# Patient Record
Sex: MALE | Race: WHITE | NOT HISPANIC OR LATINO | Employment: OTHER | URBAN - METROPOLITAN AREA
[De-identification: names, ages, dates, MRNs, and addresses within clinical notes are randomized per-mention and may not be internally consistent; named-entity substitution may affect disease eponyms.]

---

## 2022-09-23 ENCOUNTER — HOSPITAL ENCOUNTER (OUTPATIENT)
Facility: HOSPITAL | Age: 27
Discharge: HOME | End: 2022-09-25
Attending: EMERGENCY MEDICINE | Admitting: STUDENT IN AN ORGANIZED HEALTH CARE EDUCATION/TRAINING PROGRAM
Payer: COMMERCIAL

## 2022-09-23 DIAGNOSIS — L03.113 RIGHT FOREARM CELLULITIS: Primary | ICD-10-CM

## 2022-09-23 DIAGNOSIS — R07.9 CHEST PAIN: ICD-10-CM
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
## 2022-09-23 LAB
ALBUMIN SERPL BCP-MCNC: 3.8 G/DL (ref 3.5–5.2)
ALP SERPL-CCNC: 84 U/L (ref 55–135)
ALT SERPL W/O P-5'-P-CCNC: 47 U/L (ref 10–44)
ANION GAP SERPL CALC-SCNC: 7 MMOL/L (ref 8–16)
AST SERPL-CCNC: 42 U/L (ref 10–40)
BASOPHILS # BLD AUTO: 0.02 K/UL (ref 0–0.2)
BASOPHILS NFR BLD: 0.4 % (ref 0–1.9)
BILIRUB SERPL-MCNC: 0.6 MG/DL (ref 0.1–1)
BUN SERPL-MCNC: 8 MG/DL (ref 6–20)
CALCIUM SERPL-MCNC: 9.3 MG/DL (ref 8.7–10.5)
CHLORIDE SERPL-SCNC: 101 MMOL/L (ref 95–110)
CO2 SERPL-SCNC: 27 MMOL/L (ref 23–29)
CREAT SERPL-MCNC: 1.2 MG/DL (ref 0.5–1.4)
DIFFERENTIAL METHOD: NORMAL
EOSINOPHIL # BLD AUTO: 0.1 K/UL (ref 0–0.5)
EOSINOPHIL NFR BLD: 1.6 % (ref 0–8)
ERYTHROCYTE [DISTWIDTH] IN BLOOD BY AUTOMATED COUNT: 12.3 % (ref 11.5–14.5)
EST. GFR  (NO RACE VARIABLE): >60 ML/MIN/1.73 M^2
GLUCOSE SERPL-MCNC: 148 MG/DL (ref 70–110)
HCT VFR BLD AUTO: 43.3 % (ref 40–54)
HCV AB SERPL QL IA: NORMAL
HGB BLD-MCNC: 14.4 G/DL (ref 14–18)
HIV 1+2 AB+HIV1 P24 AG SERPL QL IA: NORMAL
IMM GRANULOCYTES # BLD AUTO: 0.02 K/UL (ref 0–0.04)
IMM GRANULOCYTES NFR BLD AUTO: 0.4 % (ref 0–0.5)
LACTATE SERPL-SCNC: 1 MMOL/L (ref 0.5–2.2)
LYMPHOCYTES # BLD AUTO: 1.3 K/UL (ref 1–4.8)
LYMPHOCYTES NFR BLD: 23.7 % (ref 18–48)
MCH RBC QN AUTO: 30.3 PG (ref 27–31)
MCHC RBC AUTO-ENTMCNC: 33.3 G/DL (ref 32–36)
MCV RBC AUTO: 91 FL (ref 82–98)
MONOCYTES # BLD AUTO: 0.6 K/UL (ref 0.3–1)
MONOCYTES NFR BLD: 10.6 % (ref 4–15)
NEUTROPHILS # BLD AUTO: 3.5 K/UL (ref 1.8–7.7)
NEUTROPHILS NFR BLD: 63.3 % (ref 38–73)
NRBC BLD-RTO: 0 /100 WBC
PLATELET # BLD AUTO: 222 K/UL (ref 150–450)
PMV BLD AUTO: 10 FL (ref 9.2–12.9)
POTASSIUM SERPL-SCNC: 3.7 MMOL/L (ref 3.5–5.1)
PROT SERPL-MCNC: 6.7 G/DL (ref 6–8.4)
RBC # BLD AUTO: 4.76 M/UL (ref 4.6–6.2)
SODIUM SERPL-SCNC: 135 MMOL/L (ref 136–145)
WBC # BLD AUTO: 5.57 K/UL (ref 3.9–12.7)

## 2022-09-23 PROCEDURE — 83605 ASSAY OF LACTIC ACID: CPT | Performed by: EMERGENCY MEDICINE

## 2022-09-23 PROCEDURE — 86803 HEPATITIS C AB TEST: CPT | Performed by: PHYSICIAN ASSISTANT

## 2022-09-23 PROCEDURE — 80053 COMPREHEN METABOLIC PANEL: CPT | Performed by: EMERGENCY MEDICINE

## 2022-09-23 PROCEDURE — 85025 COMPLETE CBC W/AUTO DIFF WBC: CPT | Performed by: EMERGENCY MEDICINE

## 2022-09-23 PROCEDURE — G0378 HOSPITAL OBSERVATION PER HR: HCPCS

## 2022-09-23 PROCEDURE — 99285 EMERGENCY DEPT VISIT HI MDM: CPT | Mod: 25

## 2022-09-23 PROCEDURE — 87389 HIV-1 AG W/HIV-1&-2 AB AG IA: CPT | Performed by: PHYSICIAN ASSISTANT

## 2022-09-23 PROCEDURE — 25000003 PHARM REV CODE 250: Performed by: EMERGENCY MEDICINE

## 2022-09-23 PROCEDURE — 96365 THER/PROPH/DIAG IV INF INIT: CPT

## 2022-09-23 PROCEDURE — 99285 EMERGENCY DEPT VISIT HI MDM: CPT | Mod: CS,,, | Performed by: EMERGENCY MEDICINE

## 2022-09-23 PROCEDURE — 99285 PR EMERGENCY DEPT VISIT,LEVEL V: ICD-10-PCS | Mod: CS,,, | Performed by: EMERGENCY MEDICINE

## 2022-09-23 PROCEDURE — 83036 HEMOGLOBIN GLYCOSYLATED A1C: CPT | Performed by: PHYSICIAN ASSISTANT

## 2022-09-23 PROCEDURE — 63600175 PHARM REV CODE 636 W HCPCS: Performed by: EMERGENCY MEDICINE

## 2022-09-23 RX ORDER — LIDOCAINE HYDROCHLORIDE 10 MG/ML
20 INJECTION INFILTRATION; PERINEURAL ONCE
Status: DISCONTINUED | OUTPATIENT
Start: 2022-09-23 | End: 2022-09-25 | Stop reason: HOSPADM

## 2022-09-23 RX ADMIN — PIPERACILLIN SODIUM AND TAZOBACTAM SODIUM 4.5 G: 4; .5 INJECTION, POWDER, LYOPHILIZED, FOR SOLUTION INTRAVENOUS at 10:09

## 2022-09-24 PROBLEM — F17.200 LIGHT TOBACCO SMOKER: Status: ACTIVE | Noted: 2022-09-24
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
## 2022-09-24 PROBLEM — R73.9 HYPERGLYCEMIA: Status: ACTIVE | Noted: 2022-09-24

## 2022-09-24 PROBLEM — Z78.9 ALCOHOL USE: Status: ACTIVE | Noted: 2022-09-24

## 2022-09-24 LAB
ALBUMIN SERPL BCP-MCNC: 3.6 G/DL (ref 3.5–5.2)
ALP SERPL-CCNC: 81 U/L (ref 55–135)
ALT SERPL W/O P-5'-P-CCNC: 41 U/L (ref 10–44)
ANION GAP SERPL CALC-SCNC: 8 MMOL/L (ref 8–16)
AST SERPL-CCNC: 40 U/L (ref 10–40)
BASOPHILS # BLD AUTO: 0.03 K/UL (ref 0–0.2)
BASOPHILS NFR BLD: 0.5 % (ref 0–1.9)
BILIRUB SERPL-MCNC: 0.4 MG/DL (ref 0.1–1)
BUN SERPL-MCNC: 11 MG/DL (ref 6–20)
CALCIUM SERPL-MCNC: 8.8 MG/DL (ref 8.7–10.5)
CHLORIDE SERPL-SCNC: 104 MMOL/L (ref 95–110)
CO2 SERPL-SCNC: 25 MMOL/L (ref 23–29)
CREAT SERPL-MCNC: 1 MG/DL (ref 0.5–1.4)
CRP SERPL-MCNC: 19.6 MG/L (ref 0–8.2)
DIFFERENTIAL METHOD: ABNORMAL
EOSINOPHIL # BLD AUTO: 0.3 K/UL (ref 0–0.5)
EOSINOPHIL NFR BLD: 4.1 % (ref 0–8)
ERYTHROCYTE [DISTWIDTH] IN BLOOD BY AUTOMATED COUNT: 12.3 % (ref 11.5–14.5)
ERYTHROCYTE [SEDIMENTATION RATE] IN BLOOD BY PHOTOMETRIC METHOD: 15 MM/HR (ref 0–23)
EST. GFR  (NO RACE VARIABLE): >60 ML/MIN/1.73 M^2
ESTIMATED AVG GLUCOSE: 100 MG/DL (ref 68–131)
GLUCOSE SERPL-MCNC: 92 MG/DL (ref 70–110)
HBA1C MFR BLD: 5.1 % (ref 4–5.6)
HCT VFR BLD AUTO: 42.6 % (ref 40–54)
HGB BLD-MCNC: 14.3 G/DL (ref 14–18)
IMM GRANULOCYTES # BLD AUTO: 0.02 K/UL (ref 0–0.04)
IMM GRANULOCYTES NFR BLD AUTO: 0.3 % (ref 0–0.5)
LYMPHOCYTES # BLD AUTO: 1.8 K/UL (ref 1–4.8)
LYMPHOCYTES NFR BLD: 27.8 % (ref 18–48)
MAGNESIUM SERPL-MCNC: 1.9 MG/DL (ref 1.6–2.6)
MCH RBC QN AUTO: 30.3 PG (ref 27–31)
MCHC RBC AUTO-ENTMCNC: 33.6 G/DL (ref 32–36)
MCV RBC AUTO: 90 FL (ref 82–98)
MONOCYTES # BLD AUTO: 1 K/UL (ref 0.3–1)
MONOCYTES NFR BLD: 15.4 % (ref 4–15)
NEUTROPHILS # BLD AUTO: 3.4 K/UL (ref 1.8–7.7)
NEUTROPHILS NFR BLD: 51.9 % (ref 38–73)
NRBC BLD-RTO: 0 /100 WBC
PHOSPHATE SERPL-MCNC: 4.1 MG/DL (ref 2.7–4.5)
PLATELET # BLD AUTO: 216 K/UL (ref 150–450)
PMV BLD AUTO: 10 FL (ref 9.2–12.9)
POTASSIUM SERPL-SCNC: 3.9 MMOL/L (ref 3.5–5.1)
PROT SERPL-MCNC: 6.1 G/DL (ref 6–8.4)
RBC # BLD AUTO: 4.72 M/UL (ref 4.6–6.2)
SARS-COV-2 RDRP RESP QL NAA+PROBE: NEGATIVE
SODIUM SERPL-SCNC: 137 MMOL/L (ref 136–145)
WBC # BLD AUTO: 6.55 K/UL (ref 3.9–12.7)

## 2022-09-24 PROCEDURE — 84100 ASSAY OF PHOSPHORUS: CPT | Performed by: INTERNAL MEDICINE

## 2022-09-24 PROCEDURE — 85652 RBC SED RATE AUTOMATED: CPT | Performed by: STUDENT IN AN ORGANIZED HEALTH CARE EDUCATION/TRAINING PROGRAM

## 2022-09-24 PROCEDURE — A9585 GADOBUTROL INJECTION: HCPCS | Performed by: STUDENT IN AN ORGANIZED HEALTH CARE EDUCATION/TRAINING PROGRAM

## 2022-09-24 PROCEDURE — 83735 ASSAY OF MAGNESIUM: CPT | Performed by: INTERNAL MEDICINE

## 2022-09-24 PROCEDURE — 99220 PR INITIAL OBSERVATION CARE,LEVL III: ICD-10-PCS | Mod: ,,, | Performed by: INTERNAL MEDICINE

## 2022-09-24 PROCEDURE — 96366 THER/PROPH/DIAG IV INF ADDON: CPT

## 2022-09-24 PROCEDURE — G0378 HOSPITAL OBSERVATION PER HR: HCPCS

## 2022-09-24 PROCEDURE — 80053 COMPREHEN METABOLIC PANEL: CPT | Performed by: INTERNAL MEDICINE

## 2022-09-24 PROCEDURE — 85025 COMPLETE CBC W/AUTO DIFF WBC: CPT | Performed by: INTERNAL MEDICINE

## 2022-09-24 PROCEDURE — 12001 RPR S/N/AX/GEN/TRNK 2.5CM/<: CPT

## 2022-09-24 PROCEDURE — 25000003 PHARM REV CODE 250: Performed by: INTERNAL MEDICINE

## 2022-09-24 PROCEDURE — 36415 COLL VENOUS BLD VENIPUNCTURE: CPT | Performed by: STUDENT IN AN ORGANIZED HEALTH CARE EDUCATION/TRAINING PROGRAM

## 2022-09-24 PROCEDURE — 36415 COLL VENOUS BLD VENIPUNCTURE: CPT | Performed by: INTERNAL MEDICINE

## 2022-09-24 PROCEDURE — 25500020 PHARM REV CODE 255: Performed by: STUDENT IN AN ORGANIZED HEALTH CARE EDUCATION/TRAINING PROGRAM

## 2022-09-24 PROCEDURE — 12002 RPR S/N/AX/GEN/TRNK2.6-7.5CM: CPT

## 2022-09-24 PROCEDURE — 63600175 PHARM REV CODE 636 W HCPCS: Performed by: INTERNAL MEDICINE

## 2022-09-24 PROCEDURE — 86140 C-REACTIVE PROTEIN: CPT | Performed by: STUDENT IN AN ORGANIZED HEALTH CARE EDUCATION/TRAINING PROGRAM

## 2022-09-24 PROCEDURE — 99220 PR INITIAL OBSERVATION CARE,LEVL III: CPT | Mod: ,,, | Performed by: INTERNAL MEDICINE

## 2022-09-24 PROCEDURE — U0002 COVID-19 LAB TEST NON-CDC: HCPCS | Performed by: STUDENT IN AN ORGANIZED HEALTH CARE EDUCATION/TRAINING PROGRAM

## 2022-09-24 PROCEDURE — 96372 THER/PROPH/DIAG INJ SC/IM: CPT | Performed by: INTERNAL MEDICINE

## 2022-09-24 RX ORDER — NALOXONE HCL 0.4 MG/ML
0.02 VIAL (ML) INJECTION
Status: DISCONTINUED | OUTPATIENT
Start: 2022-09-24 | End: 2022-09-25 | Stop reason: HOSPADM

## 2022-09-24 RX ORDER — HYDROCODONE BITARTRATE AND ACETAMINOPHEN 5; 325 MG/1; MG/1
1 TABLET ORAL EVERY 6 HOURS PRN
Status: DISCONTINUED | OUTPATIENT
Start: 2022-09-24 | End: 2022-09-25 | Stop reason: HOSPADM

## 2022-09-24 RX ORDER — IBUPROFEN 200 MG
24 TABLET ORAL
Status: DISCONTINUED | OUTPATIENT
Start: 2022-09-24 | End: 2022-09-25 | Stop reason: HOSPADM

## 2022-09-24 RX ORDER — POLYETHYLENE GLYCOL 3350 17 G/17G
17 POWDER, FOR SOLUTION ORAL 2 TIMES DAILY PRN
Status: DISCONTINUED | OUTPATIENT
Start: 2022-09-24 | End: 2022-09-25 | Stop reason: HOSPADM

## 2022-09-24 RX ORDER — SIMETHICONE 80 MG
1 TABLET,CHEWABLE ORAL 4 TIMES DAILY PRN
Status: DISCONTINUED | OUTPATIENT
Start: 2022-09-24 | End: 2022-09-25 | Stop reason: HOSPADM

## 2022-09-24 RX ORDER — GADOBUTROL 604.72 MG/ML
9 INJECTION INTRAVENOUS
Status: COMPLETED | OUTPATIENT
Start: 2022-09-24 | End: 2022-09-24

## 2022-09-24 RX ORDER — IBUPROFEN 200 MG
16 TABLET ORAL
Status: DISCONTINUED | OUTPATIENT
Start: 2022-09-24 | End: 2022-09-25 | Stop reason: HOSPADM

## 2022-09-24 RX ORDER — TALC
6 POWDER (GRAM) TOPICAL NIGHTLY PRN
Status: DISCONTINUED | OUTPATIENT
Start: 2022-09-24 | End: 2022-09-25 | Stop reason: HOSPADM

## 2022-09-24 RX ORDER — ENOXAPARIN SODIUM 100 MG/ML
40 INJECTION SUBCUTANEOUS EVERY 24 HOURS
Status: DISCONTINUED | OUTPATIENT
Start: 2022-09-24 | End: 2022-09-25 | Stop reason: HOSPADM

## 2022-09-24 RX ORDER — GLUCAGON 1 MG
1 KIT INJECTION
Status: DISCONTINUED | OUTPATIENT
Start: 2022-09-24 | End: 2022-09-25 | Stop reason: HOSPADM

## 2022-09-24 RX ORDER — SODIUM CHLORIDE 0.9 % (FLUSH) 0.9 %
10 SYRINGE (ML) INJECTION
Status: DISCONTINUED | OUTPATIENT
Start: 2022-09-24 | End: 2022-09-25 | Stop reason: HOSPADM

## 2022-09-24 RX ORDER — ONDANSETRON 4 MG/1
4 TABLET, ORALLY DISINTEGRATING ORAL EVERY 8 HOURS PRN
Status: DISCONTINUED | OUTPATIENT
Start: 2022-09-24 | End: 2022-09-25 | Stop reason: HOSPADM

## 2022-09-24 RX ORDER — ACETAMINOPHEN 325 MG/1
650 TABLET ORAL EVERY 8 HOURS PRN
Status: DISCONTINUED | OUTPATIENT
Start: 2022-09-24 | End: 2022-09-25 | Stop reason: HOSPADM

## 2022-09-24 RX ORDER — PROCHLORPERAZINE EDISYLATE 5 MG/ML
5 INJECTION INTRAMUSCULAR; INTRAVENOUS EVERY 6 HOURS PRN
Status: DISCONTINUED | OUTPATIENT
Start: 2022-09-24 | End: 2022-09-25 | Stop reason: HOSPADM

## 2022-09-24 RX ORDER — IPRATROPIUM BROMIDE AND ALBUTEROL SULFATE 2.5; .5 MG/3ML; MG/3ML
3 SOLUTION RESPIRATORY (INHALATION) EVERY 6 HOURS PRN
Status: DISCONTINUED | OUTPATIENT
Start: 2022-09-24 | End: 2022-09-25 | Stop reason: HOSPADM

## 2022-09-24 RX ADMIN — PIPERACILLIN SODIUM AND TAZOBACTAM SODIUM 4.5 G: 4; .5 INJECTION, POWDER, LYOPHILIZED, FOR SOLUTION INTRAVENOUS at 10:09

## 2022-09-24 RX ADMIN — GADOBUTROL 9 ML: 604.72 INJECTION INTRAVENOUS at 12:09

## 2022-09-24 RX ADMIN — PIPERACILLIN SODIUM AND TAZOBACTAM SODIUM 4.5 G: 4; .5 INJECTION, POWDER, LYOPHILIZED, FOR SOLUTION INTRAVENOUS at 02:09

## 2022-09-24 RX ADMIN — PIPERACILLIN SODIUM AND TAZOBACTAM SODIUM 4.5 G: 4; .5 INJECTION, POWDER, LYOPHILIZED, FOR SOLUTION INTRAVENOUS at 07:09

## 2022-09-24 RX ADMIN — ENOXAPARIN SODIUM 40 MG: 100 INJECTION SUBCUTANEOUS at 05:09

## 2022-09-24 NOTE — HPI
Geovanni Lopez is a 27 y.o. male presenting with a dog bite to the right forearm 2 days ago.  The wound has surrounding erythema and light draining purulence from dorsal forearm puncture wound, he reports mild paresthesias in a small distribution distal to the wound.  MRI shows complete laceration and 4 cm of retraction of the ECU.  He is up-to-date on tetanus and reports that the dog was up-to-date on all vaccines.  He received Zosyn in the emergency room and was admitted to hospital medicine for IV antibiotics due to right forearm cellulitis.

## 2022-09-24 NOTE — H&P
"[facility] - Emergency Dept  [facility]  History & Physical    Patient Name: Geovanni Lopez  MRN: [id number]  Patient Class: OP- Observation  Admission Date: 9/23/2022  Attending Physician: Tobin Eden MD   Primary Care Provider: No primary care provider on file.      Patient information was obtained from patient, past medical records and ER records.     Subjective:     Principal Problem:Dog bite    Chief Complaint:   Chief Complaint   Patient presents with    Animal Bite     Got bit by dog 2 days ago. Pt sent to  and did x-ray and showed air in soft tissue around the wound." Pt believes dog was up to date on shots. Pt received tetanus, rocephin, and toradol shots at .         HPI: 28 yo male with tobacco use presenting after being bitten by a dog two days ago on his right arm. He went to urgent care as the wound was bothering him, and XR showed "air in soft tissue." He believes the dog was up to date on his shots. He received a tetanus shot and abx at urgent care and abx which helped him. He was then told to present to the ED.    In ED, labs collected, ortho consulted, MRI ordered, medicine called for admission for abx.          Past Medical History:   Diagnosis Date    Scoliosis        Past Surgical History:   Procedure Laterality Date    WISDOM TOOTH EXTRACTION         Review of patient's allergies indicates:  No Known Allergies    No current facility-administered medications on file prior to encounter.     No current outpatient medications on file prior to encounter.     Family History       Problem Relation (Age of Onset)    Muscular dystrophy Brother    Rheum arthritis Father          Tobacco Use    Smoking status: Some Days     Types: Cigarettes    Smokeless tobacco: Never   Substance and Sexual Activity    Alcohol use: Yes     Alcohol/week: 18.0 standard drinks     Types: 18 Cans of beer per week    Drug use: Not Currently    Sexual activity: Not on file     Review of Systems "   Constitutional:  Negative for activity change, appetite change, chills and fever.   HENT:  Negative for congestion, hearing loss and rhinorrhea.    Eyes:  Negative for discharge, itching and visual disturbance.   Respiratory:  Negative for apnea, cough and shortness of breath.    Cardiovascular:  Negative for chest pain, palpitations and leg swelling.   Gastrointestinal:  Negative for abdominal distention, abdominal pain, constipation, diarrhea, nausea and vomiting.   Endocrine: Negative for cold intolerance and heat intolerance.   Genitourinary:  Negative for dysuria and hematuria.   Musculoskeletal:  Negative for back pain, neck pain and neck stiffness.   Skin:  Negative for rash and wound.   Neurological:  Negative for dizziness, seizures, light-headedness and headaches.   Psychiatric/Behavioral:  Negative for agitation, confusion and suicidal ideas.    Objective:     Vital Signs (Most Recent):  Temp: 97.7 °F (36.5 °C) (09/23/22 2124)  Pulse: 83 (09/23/22 2124)  Resp: 16 (09/23/22 2124)  BP: 137/73 (09/23/22 2124)  SpO2: 98 % (09/23/22 2124) Vital Signs (24h Range):  Temp:  [97.7 °F (36.5 °C)] 97.7 °F (36.5 °C)  Pulse:  [83] 83  Resp:  [16] 16  SpO2:  [98 %] 98 %  BP: (137)/(73) 137/73     Weight: 86.2 kg (190 lb)  Body mass index is 23.13 kg/m².    Physical Exam  Vitals reviewed.   Constitutional:       General: He is not in acute distress.     Appearance: He is well-developed.   HENT:      Head: Normocephalic and atraumatic.      Nose: Nose normal. No rhinorrhea.      Mouth/Throat:      Mouth: Mucous membranes are moist.   Eyes:      General: No scleral icterus.        Right eye: No discharge.         Left eye: No discharge.      Pupils: Pupils are equal, round, and reactive to light.   Neck:      Vascular: No JVD.   Cardiovascular:      Rate and Rhythm: Normal rate and regular rhythm.      Heart sounds: Normal heart sounds. No murmur heard.    No friction rub.   Pulmonary:      Effort: Pulmonary effort is  "normal. No respiratory distress.      Breath sounds: Normal breath sounds. No wheezing.   Abdominal:      General: Bowel sounds are normal. There is no distension.      Palpations: Abdomen is soft.      Tenderness: There is no abdominal tenderness.   Musculoskeletal:         General: No deformity. Normal range of motion.      Cervical back: Normal range of motion and neck supple.   Skin:     General: Skin is warm and dry.      Comments: Right arm with bit, puncture wounds, non bleeding, small amount of swelling in the area   Neurological:      General: No focal deficit present.      Mental Status: He is alert and oriented to person, place, and time.   Psychiatric:         Mood and Affect: Mood normal.         Behavior: Behavior normal.         CRANIAL NERVES     CN III, IV, VI   Pupils are equal, round, and reactive to light.     Significant Labs: All pertinent labs within the past 24 hours have been reviewed.  CBC:   Recent Labs   Lab 09/23/22  2154   WBC 5.57   HGB 14.4   HCT 43.3        CMP:   Recent Labs   Lab 09/23/22  2154   *   K 3.7      CO2 27   *   BUN 8   CREATININE 1.2   CALCIUM 9.3   PROT 6.7   ALBUMIN 3.8   BILITOT 0.6   ALKPHOS 84   AST 42*   ALT 47*   ANIONGAP 7*       Significant Imaging: I have reviewed all pertinent imaging results/findings within the past 24 hours.    Assessment/Plan:     * Dog bite  Acute, on zosyn, will continue  Tetanus shot given at outside facility  Believes dog is uptodate on vaccines  MRI performed, ortho consulted, appreciate recs  MRI results,  "1. Soft tissue edema, enhancement, and soft tissue gas within the volar subcutaneous tissues of the right forearm.  Correlate for evidence of cellulitis.  No evidence of a rim enhancing fluid collection or evidence of deep fascial or muscular edema.  2. Complete tear of the flexor carpi ulnaris tendon with 4 cm retraction"    Alcohol use  counseled on cessation  No previous history of withdrawals  Will " monitor on CIWA  Can start PRNs if getting elevated      Light tobacco smoker  counseled on cessation, nicotine patches can be ordered if needed      Hyperglycemia  Will check a1C, possibly diet induced, acute at this time, no history of diabetes        VTE Risk Mitigation (From admission, onward)         Ordered     enoxaparin injection 40 mg  Daily         09/24/22 0015     IP VTE HIGH RISK PATIENT  Once         09/24/22 0015     Place sequential compression device  Until discontinued         09/24/22 0015     IP VTE HIGH RISK PATIENT  Once         09/24/22 0015     Place sequential compression device  Until discontinued         09/24/22 0015                   Phoenix Small MD  Department of Hospital Medicine   [facility] - Emergency Dept

## 2022-09-24 NOTE — HPI
"26 yo male with tobacco use presenting after being bitten by a dog two days ago on his right arm. He went to urgent care as the wound was bothering him, and XR showed "air in soft tissue." He believes the dog was up to date on his shots. He received a tetanus shot and abx at urgent care and abx which helped him. He was then told to present to the ED.    In ED, labs collected, ortho consulted, MRI ordered, medicine called for admission for abx.      "

## 2022-09-24 NOTE — SUBJECTIVE & OBJECTIVE
Past Medical History:   Diagnosis Date    Scoliosis        Past Surgical History:   Procedure Laterality Date    WISDOM TOOTH EXTRACTION         Review of patient's allergies indicates:  No Known Allergies    Current Facility-Administered Medications   Medication    acetaminophen tablet 650 mg    albuterol-ipratropium 2.5 mg-0.5 mg/3 mL nebulizer solution 3 mL    dextrose 10% bolus 125 mL    dextrose 10% bolus 250 mL    enoxaparin injection 40 mg    glucagon (human recombinant) injection 1 mg    glucose chewable tablet 16 g    glucose chewable tablet 24 g    HYDROcodone-acetaminophen 5-325 mg per tablet 1 tablet    LIDOcaine HCL 10 mg/ml (1%) injection 20 mL    melatonin tablet 6 mg    naloxone 0.4 mg/mL injection 0.02 mg    ondansetron disintegrating tablet 4 mg    piperacillin-tazobactam 4.5 g in sodium chloride 0.9% 100 mL IVPB (ready to mix system)    polyethylene glycol packet 17 g    prochlorperazine injection Soln 5 mg    simethicone chewable tablet 80 mg    sodium chloride 0.9% flush 10 mL    sodium chloride 0.9% flush 10 mL     No current outpatient medications on file.     Family History       Problem Relation (Age of Onset)    Muscular dystrophy Brother    Rheum arthritis Father          Tobacco Use    Smoking status: Some Days     Types: Cigarettes    Smokeless tobacco: Never   Substance and Sexual Activity    Alcohol use: Yes     Alcohol/week: 18.0 standard drinks     Types: 18 Cans of beer per week    Drug use: Not Currently    Sexual activity: Not on file     ROS  Constitutional: negative for fevers/chills/night sweats  Eyes: no acute visual changes  ENT: negative acute  for hearing loss  Respiratory: negative for dyspnea  Cardiovascular: negative for chest pain  Gastrointestinal: negative for abdominal pain  Genitourinary: negative for dysuria  Neurological: negative for headaches  Behavioral/Psych: negative for hallucinations  Endocrine: negative for temperature intolerance  MSK: per  "HPI    Objective:     Vital Signs (Most Recent):  Temp: 97.7 °F (36.5 °C) (09/23/22 2124)  Pulse: 83 (09/23/22 2124)  Resp: 16 (09/23/22 2124)  BP: 137/73 (09/23/22 2124)  SpO2: 98 % (09/23/22 2124) Vital Signs (24h Range):  Temp:  [97.7 °F (36.5 °C)] 97.7 °F (36.5 °C)  Pulse:  [83] 83  Resp:  [16] 16  SpO2:  [98 %] 98 %  BP: (137)/(73) 137/73     Weight: 86.2 kg (190 lb)  Height: 6' 4" (193 cm)  Body mass index is 23.13 kg/m².      Intake/Output Summary (Last 24 hours) at 9/24/2022 0157  Last data filed at 9/23/2022 2334  Gross per 24 hour   Intake 100 ml   Output --   Net 100 ml       Ortho/SPM Exam    General:  no acute distress, appears stated age   Neuro: alert and oriented x3  Psych: normal mood  Head: normocephalic, atraumatic.  Eyes: no scleral icterus  Mouth: moist mucous membranes  Cardiovascular: extremities warm and well perfused  Lungs: breathing comfortably, equal chest rise bilat  Skin: clean, dry, intact (any exceptions noted in below musculoskeletal exam)       Musculoskeletal  Right Upper Extremity     -Several small puncture wound to the right forearm, with 1cm open wound to the dorsal forearm with tiny amount of expressible purulence, light surrounding erythema   -Mild TTP to forearm  -Deltoid, biceps, triceps intact   -Compartments soft and compressible  -A/P ROM full in fingers, wrist, elbow, shoulder  -SILT M/R/U/Ax  -Motor intact Ain/PIN/U/Ax  -WWP     Left Upper Extremity     -Skin, Intact : no ecchymosis, lesions, lacerations or abrasions   -Non tender to palpation of entire arm   -Deltoid, biceps, triceps intact   -Compartments soft and compressible  -A/P ROM full in fingers, wrist, elbow, shoulder  -SILT M/R/U/Ax  -Motor intact Ain/PIN/U/Ax  -WWP     Right Lower Extremity Exam     - Skin Intact : no ecchymosis, lesions, lacerations or abrasions   - Non-tender to palpation of the entire leg  - Quad/ Hip flexor intact   - Compartments soft and compressible  - A/P ROM full to the toes, " ankle, knee, and hip  - TA/EHL/Gastroc/FHL intact  - SILT throughout  - WWP        Left Lower Extremity Exam    - Skin Intact : no ecchymosis, lesions, lacerations or abrasions   - Non-tender to palpation of the entire leg  - Quad/ Hip flexor intact   - Compartments soft and compressible  - A/P ROM full to the toes, ankle, knee, and hip  - TA/EHL/Gastroc/FHL intact  - SILT throughout  - WWP       All joints (shoulder/elbow/wrist/hip/knee/ankle) were examined and had full ROM and were non-tender to palpation except as above         Significant Labs: CBC:   Recent Labs   Lab 09/23/22  2154   WBC 5.57   HGB 14.4   HCT 43.3        CMP:   Recent Labs   Lab 09/23/22 2154   *   K 3.7      CO2 27   *   BUN 8   CREATININE 1.2   CALCIUM 9.3   PROT 6.7   ALBUMIN 3.8   BILITOT 0.6   ALKPHOS 84   AST 42*   ALT 47*   ANIONGAP 7*     All pertinent labs within the past 24 hours have been reviewed.    Significant Imaging: I have reviewed and interpreted all pertinent imaging results/findings.  X-ray and MRI reviewed x-ray shows no acute fractures dislocations there is a small amount of subcutaneous air.  MRI shows cellulitis without signs of infection tracking along the fascial sheaths.  There is of complete laceration of the right forearm ECU with 4 cm of retraction.

## 2022-09-24 NOTE — PLAN OF CARE
Hospital Medicine Plan of Care Note    Admission H&P dated earlier this morning reviewed, and agree with assessment and plan as documented. Pt seen and examined this morning on rounds, SANGEETHA.     Evaluated and positioned by ortho, continuing IV abx. Pain well-controlled, VSS. NPO at MN for operative repair tomorrow.      Tobin Eden MD  Attending Physician  Department of Hospital Medicine  Epic secure chat preferred, or ext. [phone]  9/24/2022

## 2022-09-24 NOTE — ASSESSMENT & PLAN NOTE
counseled on cessation  No previous history of withdrawals  Will monitor on CIWA  Can start PRNs if getting elevated

## 2022-09-24 NOTE — CONSULTS
"[facility] - Surgery  Orthopedics  Consult Note    Patient Name: Geovanni Lopez  MRN: [id number]  Admission Date: 9/23/2022  Hospital Length of Stay: 0 days  Attending Provider: Tobin Eden MD  Primary Care Provider: No primary care provider on file.      Inpatient consult to Orthopedic Surgery  Consult performed by: Jensen Rojas MD  Consult ordered by: Tobin Eden MD        Subjective:     Principal Problem:Dog bite    Chief Complaint:   Chief Complaint   Patient presents with    Animal Bite     Got bit by dog 2 days ago. Pt sent to  and did x-ray and showed air in soft tissue around the wound." Pt believes dog was up to date on shots. Pt received tetanus, rocephin, and toradol shots at .         HPI: Geovanni Lopez is a 27 y.o. male presenting with a dog bite to the right forearm 2 days ago.  The wound has surrounding erythema and light draining purulence from volar forearm puncture wound, he reports mild paresthesias in a small distribution distal to the wound.  MRI shows complete laceration and 4 cm of retraction of the FCU.  He is up-to-date on tetanus and reports that the dog was up-to-date on all vaccines.  He received Zosyn in the emergency room and was admitted to hospital medicine for IV antibiotics due to right forearm cellulitis.       Past Medical History:   Diagnosis Date    Scoliosis        Past Surgical History:   Procedure Laterality Date    WISDOM TOOTH EXTRACTION         Review of patient's allergies indicates:  No Known Allergies    Current Facility-Administered Medications   Medication    acetaminophen tablet 650 mg    albuterol-ipratropium 2.5 mg-0.5 mg/3 mL nebulizer solution 3 mL    dextrose 10% bolus 125 mL    dextrose 10% bolus 250 mL    enoxaparin injection 40 mg    glucagon (human recombinant) injection 1 mg    glucose chewable tablet 16 g    glucose chewable tablet 24 g    HYDROcodone-acetaminophen 5-325 mg per tablet 1 tablet    LIDOcaine HCL 10 mg/ml (1%) injection 20 mL " "   melatonin tablet 6 mg    naloxone 0.4 mg/mL injection 0.02 mg    ondansetron disintegrating tablet 4 mg    piperacillin-tazobactam 4.5 g in sodium chloride 0.9% 100 mL IVPB (ready to mix system)    polyethylene glycol packet 17 g    prochlorperazine injection Soln 5 mg    simethicone chewable tablet 80 mg    sodium chloride 0.9% flush 10 mL    sodium chloride 0.9% flush 10 mL     No current outpatient medications on file.     Family History       Problem Relation (Age of Onset)    Muscular dystrophy Brother    Rheum arthritis Father          Tobacco Use    Smoking status: Some Days     Types: Cigarettes    Smokeless tobacco: Never   Substance and Sexual Activity    Alcohol use: Yes     Alcohol/week: 18.0 standard drinks     Types: 18 Cans of beer per week    Drug use: Not Currently    Sexual activity: Not on file     ROS  Constitutional: negative for fevers/chills/night sweats  Eyes: no acute visual changes  ENT: negative acute  for hearing loss  Respiratory: negative for dyspnea  Cardiovascular: negative for chest pain  Gastrointestinal: negative for abdominal pain  Genitourinary: negative for dysuria  Neurological: negative for headaches  Behavioral/Psych: negative for hallucinations  Endocrine: negative for temperature intolerance  MSK: per HPI    Objective:     Vital Signs (Most Recent):  Temp: 97.7 °F (36.5 °C) (09/23/22 2124)  Pulse: 83 (09/23/22 2124)  Resp: 16 (09/23/22 2124)  BP: 137/73 (09/23/22 2124)  SpO2: 98 % (09/23/22 2124) Vital Signs (24h Range):  Temp:  [97.7 °F (36.5 °C)] 97.7 °F (36.5 °C)  Pulse:  [83] 83  Resp:  [16] 16  SpO2:  [98 %] 98 %  BP: (137)/(73) 137/73     Weight: 86.2 kg (190 lb)  Height: 6' 4" (193 cm)  Body mass index is 23.13 kg/m².      Intake/Output Summary (Last 24 hours) at 9/24/2022 0157  Last data filed at 9/23/2022 2334  Gross per 24 hour   Intake 100 ml   Output --   Net 100 ml       Ortho/SPM Exam    General:  no acute distress, appears stated age   Neuro: alert and " oriented x3  Psych: normal mood  Head: normocephalic, atraumatic.  Eyes: no scleral icterus  Mouth: moist mucous membranes  Cardiovascular: extremities warm and well perfused  Lungs: breathing comfortably, equal chest rise bilat  Skin: clean, dry, intact (any exceptions noted in below musculoskeletal exam)       Musculoskeletal  Right Upper Extremity     -Several small puncture wound to the right forearm, with 1cm open wound to the dorsal forearm with tiny amount of expressible purulence, light surrounding erythema   -Mild TTP to forearm  -Deltoid, biceps, triceps intact   -Compartments soft and compressible  -A/P ROM full in fingers, wrist, elbow, shoulder  -SILT M/R/U/Ax  -Motor intact Ain/PIN/U/Ax  -WWP     Left Upper Extremity     -Skin, Intact : no ecchymosis, lesions, lacerations or abrasions   -Non tender to palpation of entire arm   -Deltoid, biceps, triceps intact   -Compartments soft and compressible  -A/P ROM full in fingers, wrist, elbow, shoulder  -SILT M/R/U/Ax  -Motor intact Ain/PIN/U/Ax  -WWP     Right Lower Extremity Exam     - Skin Intact : no ecchymosis, lesions, lacerations or abrasions   - Non-tender to palpation of the entire leg  - Quad/ Hip flexor intact   - Compartments soft and compressible  - A/P ROM full to the toes, ankle, knee, and hip  - TA/EHL/Gastroc/FHL intact  - SILT throughout  - WWP        Left Lower Extremity Exam    - Skin Intact : no ecchymosis, lesions, lacerations or abrasions   - Non-tender to palpation of the entire leg  - Quad/ Hip flexor intact   - Compartments soft and compressible  - A/P ROM full to the toes, ankle, knee, and hip  - TA/EHL/Gastroc/FHL intact  - SILT throughout  - WWP       All joints (shoulder/elbow/wrist/hip/knee/ankle) were examined and had full ROM and were non-tender to palpation except as above         Significant Labs: CBC:   Recent Labs   Lab 09/23/22 2154   WBC 5.57   HGB 14.4   HCT 43.3        CMP:   Recent Labs   Lab 09/23/22 2154    *   K 3.7      CO2 27   *   BUN 8   CREATININE 1.2   CALCIUM 9.3   PROT 6.7   ALBUMIN 3.8   BILITOT 0.6   ALKPHOS 84   AST 42*   ALT 47*   ANIONGAP 7*     All pertinent labs within the past 24 hours have been reviewed.    Significant Imaging: I have reviewed and interpreted all pertinent imaging results/findings.  X-ray and MRI reviewed x-ray shows no acute fractures dislocations there is a small amount of subcutaneous air.  MRI shows cellulitis without signs of infection tracking along the fascial sheaths.  There is of complete laceration of the right forearm FCU with 4 cm of retraction.    Assessment/Plan:     * Dog bite: Right forearm  Geovanni Lopez is a 27 y.o. male presenting with a 2 day old dog bite injury to the right volar forearm with purulent drainage from 1 cm wound to the volar forearm.  He reports the dog was up-to-date on all vaccines.  He is up-to-date on tetanus and received Zosyn on admission to the ER.  He is admitted to Hospital Medicine for IV antibiotics. He is a  and uses his right hand frequently.     --Irrigation debridement and packing placement performed in the emergency room   --Continue IV antibiotics   --Elevate RUE and splint   --Pain control MM  --NPO as a precaution   --Will discuss further plan with staff    Procedure Note: Laceration Repair  After time out was performed and patient ID, side, and site were verified, the right arm was sterilly prepped in the standard fashion.  10 mL's of 1% lidocaine were injected around the site with a 25-gauge needle. After adequate analgesia was obtained, the wound was examined. Examination showed a 1cm wound that tracted deep and distal about 4cm. The laceration was then thoroughly irrigated with normal saline and betadine. At this point, the wound was packed with gauze and covered with a soft dressings. The patient tolerated the procedure well with no complications.  Blood loss was minimal.            Jensen  MD Bob  Orthopedics  [facility] - Surgery

## 2022-09-24 NOTE — SUBJECTIVE & OBJECTIVE
Past Medical History:   Diagnosis Date    Scoliosis        Past Surgical History:   Procedure Laterality Date    WISDOM TOOTH EXTRACTION         Review of patient's allergies indicates:  No Known Allergies    No current facility-administered medications on file prior to encounter.     No current outpatient medications on file prior to encounter.     Family History       Problem Relation (Age of Onset)    Muscular dystrophy Brother    Rheum arthritis Father          Tobacco Use    Smoking status: Some Days     Types: Cigarettes    Smokeless tobacco: Never   Substance and Sexual Activity    Alcohol use: Yes     Alcohol/week: 18.0 standard drinks     Types: 18 Cans of beer per week    Drug use: Not Currently    Sexual activity: Not on file     Review of Systems   Constitutional:  Negative for activity change, appetite change, chills and fever.   HENT:  Negative for congestion, hearing loss and rhinorrhea.    Eyes:  Negative for discharge, itching and visual disturbance.   Respiratory:  Negative for apnea, cough and shortness of breath.    Cardiovascular:  Negative for chest pain, palpitations and leg swelling.   Gastrointestinal:  Negative for abdominal distention, abdominal pain, constipation, diarrhea, nausea and vomiting.   Endocrine: Negative for cold intolerance and heat intolerance.   Genitourinary:  Negative for dysuria and hematuria.   Musculoskeletal:  Negative for back pain, neck pain and neck stiffness.   Skin:  Negative for rash and wound.   Neurological:  Negative for dizziness, seizures, light-headedness and headaches.   Psychiatric/Behavioral:  Negative for agitation, confusion and suicidal ideas.    Objective:     Vital Signs (Most Recent):  Temp: 97.7 °F (36.5 °C) (09/23/22 2124)  Pulse: 83 (09/23/22 2124)  Resp: 16 (09/23/22 2124)  BP: 137/73 (09/23/22 2124)  SpO2: 98 % (09/23/22 2124) Vital Signs (24h Range):  Temp:  [97.7 °F (36.5 °C)] 97.7 °F (36.5 °C)  Pulse:  [83] 83  Resp:  [16] 16  SpO2:  [98  %] 98 %  BP: (137)/(73) 137/73     Weight: 86.2 kg (190 lb)  Body mass index is 23.13 kg/m².    Physical Exam  Vitals reviewed.   Constitutional:       General: He is not in acute distress.     Appearance: He is well-developed.   HENT:      Head: Normocephalic and atraumatic.      Nose: Nose normal. No rhinorrhea.      Mouth/Throat:      Mouth: Mucous membranes are moist.   Eyes:      General: No scleral icterus.        Right eye: No discharge.         Left eye: No discharge.      Pupils: Pupils are equal, round, and reactive to light.   Neck:      Vascular: No JVD.   Cardiovascular:      Rate and Rhythm: Normal rate and regular rhythm.      Heart sounds: Normal heart sounds. No murmur heard.    No friction rub.   Pulmonary:      Effort: Pulmonary effort is normal. No respiratory distress.      Breath sounds: Normal breath sounds. No wheezing.   Abdominal:      General: Bowel sounds are normal. There is no distension.      Palpations: Abdomen is soft.      Tenderness: There is no abdominal tenderness.   Musculoskeletal:         General: No deformity. Normal range of motion.      Cervical back: Normal range of motion and neck supple.   Skin:     General: Skin is warm and dry.      Comments: Right arm with bit, puncture wounds, non bleeding, small amount of swelling in the area   Neurological:      General: No focal deficit present.      Mental Status: He is alert and oriented to person, place, and time.   Psychiatric:         Mood and Affect: Mood normal.         Behavior: Behavior normal.         CRANIAL NERVES     CN III, IV, VI   Pupils are equal, round, and reactive to light.     Significant Labs: All pertinent labs within the past 24 hours have been reviewed.  CBC:   Recent Labs   Lab 09/23/22 2154   WBC 5.57   HGB 14.4   HCT 43.3        CMP:   Recent Labs   Lab 09/23/22 2154   *   K 3.7      CO2 27   *   BUN 8   CREATININE 1.2   CALCIUM 9.3   PROT 6.7   ALBUMIN 3.8   BILITOT 0.6    ALKPHOS 84   AST 42*   ALT 47*   ANIONGAP 7*       Significant Imaging: I have reviewed all pertinent imaging results/findings within the past 24 hours.

## 2022-09-24 NOTE — ED NOTES
"Geovanni Lopez, a 27 y.o. male presents to the ED w/ complaint of     Triage note:  Chief Complaint   Patient presents with    Animal Bite     Got bit by dog 2 days ago. Pt sent to  and did x-ray and showed air in soft tissue around the wound." Pt believes dog was up to date on shots. Pt received tetanus, rocephin, and toradol shots at .      Review of patient's allergies indicates:  No Known Allergies  History reviewed. No pertinent past medical history. Patient identifiers for Geovanni Lopez checked and correct.    LOC: The patient is awake, alert and aware of environment with an appropriate affect, the patient is oriented x 4 and speaking appropriately.  APPEARANCE: Patient resting comfortably and in no acute distress, patient is clean and well groomed, patient's clothing is properly fastened.  SKIN: The skin is warm and dry, color consistent with ethnicity, patient has normal skin turgor and moist mucus membranes, skin intact, no breakdown or bruising noted. Unable to assess skin around animal bite d/t it being wrapped  MUSCULOSKELETAL: Patient moving all extremities well, no obvious swelling or deformities noted.  RESPIRATORY: Airway is open and patent, respirations are spontaneous and even, patient has a normal effort and rate.  CARDIAC: Patient has a normal rate and rhythm, no periphreal edema noted, capillary refill < 3 seconds. Normal +2 pedal pulses present.  ABDOMEN: Soft and non tender to palpation, no distention noted. Patient denies any nausea, vomiting, diarrhea, or constipation.   NEUROLOGIC: Eyes open spontaneously, PERRL, behavior appropriate to situation, follows commands, facial expression symmetrical, bilateral hand grasp equal and even, purposeful motor response noted, normal sensation in all extremities.     Allergies reported: Review of patient's allergies indicates:  No Known Allergies   "

## 2022-09-24 NOTE — ASSESSMENT & PLAN NOTE
Geovanni Lopez is a 27 y.o. male presenting with a 2 day old dog bite injury to the right dorsal forearm with purulent drainage from 1 cm wound to the dorsal forearm.  He reports the dog was up-to-date on all vaccines.  He is up-to-date on tetanus and received Zosyn on admission to the ER.  He is admitted to Hospital Medicine for IV antibiotics. He is a  and uses his right hand frequently.     --Irrigation debridement and packing placement performed in the emergency room   --Continue IV antibiotics   --Elevate RUE   --Pain control MM  --NPO as a precaution   --Will discuss further plan with staff    Procedure Note: Laceration Repair  After time out was performed and patient ID, side, and site were verified, the right arm was sterilly prepped in the standard fashion.  10 mL's of 1% lidocaine were injected around the site with a 25-gauge needle. After adequate analgesia was obtained, the wound was examined. Examination showed a 1cm wound that tract deep and distal about 4cm. The laceration was then thoroughly irrigated with normal saline and betadine. At this point, the wound was packed with gauze and covered with a soft dressings. The patient tolerated the procedure well with no complications.  Blood loss was minimal.

## 2022-09-24 NOTE — ED PROVIDER NOTES
"Encounter Date: 9/23/2022    SCRIBE #1 NOTE: I, Zeenat Casas, am scribing for, and in the presence of,  Moses Maldonado III, MD. I have scribed the entire note.     History     Chief Complaint   Patient presents with    Animal Bite     Got bit by dog 2 days ago. Pt sent to  and did x-ray and showed air in soft tissue around the wound." Pt believes dog was up to date on shots. Pt received tetanus, rocephin, and toradol shots at .      Time patient was seen by the provider: 9:55 PM      The patient is a 27 y.o. male with no significant past medical history who presents to the ED with a complaint of dog bite to his right lower arm 2 days ago. He notes numbness to his distal forearm near the wound. He went to urgent care today where he received an X-ray, tetanus shot, antibiotics, and pain medication. The X-ray showed air in the wound and he was told to come to the ED.    The history is provided by the patient and medical records. No  was used.   Review of patient's allergies indicates:  No Known Allergies  History reviewed. No pertinent past medical history.  History reviewed. No pertinent surgical history.  History reviewed. No pertinent family history.     Review of Systems   Constitutional:  Negative for fever.   Eyes:  Negative for visual disturbance.   Respiratory:  Negative for shortness of breath.    Cardiovascular:  Negative for chest pain.   Gastrointestinal:  Negative for abdominal pain, nausea and vomiting.   Genitourinary:  Negative for dysuria.   Musculoskeletal:  Negative for back pain and neck pain.   Skin:  Positive for wound (right forearm). Negative for rash.   Neurological:  Positive for numbness (right distal forearm). Negative for weakness.   Hematological:  Does not bruise/bleed easily.     Physical Exam     Initial Vitals [09/23/22 2124]   BP Pulse Resp Temp SpO2   137/73 83 16 97.7 °F (36.5 °C) 98 %      MAP       --         Physical Exam    Nursing note and vitals " reviewed.  Constitutional: He appears well-developed and well-nourished. No distress.   HENT:   Head: Normocephalic and atraumatic.   Eyes: EOM are normal. Pupils are equal, round, and reactive to light.   Neck: Neck supple.   Normal range of motion.  Musculoskeletal:         General: No tenderness or edema. Normal range of motion.      Cervical back: Normal range of motion and neck supple.     Neurological: He is alert and oriented to person, place, and time. He has normal strength. No cranial nerve deficit or sensory deficit.   Skin: Skin is warm. No rash noted. There is erythema.   8 by 6 cm area of erythema and warmth to right forearm with some crepitus present. A few small puncture wounds with one large puncture wound at the center of the erythema. Subjective numbness to distal medial forearm.   Psychiatric: He has a normal mood and affect. His behavior is normal. Judgment and thought content normal.       ED Course   Procedures  Labs Reviewed   COMPREHENSIVE METABOLIC PANEL - Abnormal; Notable for the following components:       Result Value    Sodium 135 (*)     Glucose 148 (*)     AST 42 (*)     ALT 47 (*)     Anion Gap 7 (*)     All other components within normal limits    Narrative:     Release to patient->Immediate   HIV 1 / 2 ANTIBODY    Narrative:     Release to patient->Immediate   HEPATITIS C ANTIBODY    Narrative:     Release to patient->Immediate   CBC W/ AUTO DIFFERENTIAL    Narrative:     Release to patient->Immediate   LACTIC ACID, PLASMA    Narrative:     Release to patient->Immediate   SARS-COV-2 RDRP GENE          Imaging Results              MRI Forearm W WO Contrast Right (In process)                      X-Ray Forearm Right (Final result)  Result time 09/23/22 22:52:12      Final result by Tree Hidalgo MD (09/23/22 22:52:12)                   Impression:      There is no evidence of fracture or subluxation.    Soft tissue injury involving the anteromedial right forearm with air in the  soft tissues consistent with history of penetrating animal bite injury. No radiopaque foreign body seen.      Electronically signed by: Tree Hidalgo MD  Date:    09/23/2022  Time:    22:52               Narrative:    EXAMINATION:  XR FOREARM RIGHT    CLINICAL HISTORY:  Bitten by dog, initial encounter    TECHNIQUE:  AP and lateral views of the right forearm were performed.    COMPARISON:  None    FINDINGS:  No fractures or dislocations.  Unremarkable visualized bony structures. Soft tissue injury involving the anteromedial right forearm with air in the soft tissues consistent with history of penetrating animal bite injury.  No radiopaque foreign body seen.                                       Medications   LIDOcaine HCL 10 mg/ml (1%) injection 20 mL (has no administration in time range)   piperacillin-tazobactam 4.5 g in sodium chloride 0.9% 100 mL IVPB (ready to mix system) (0 g Intravenous Stopped 9/23/22 6665)     Medical Decision Making:   History:   Old Medical Records: I decided to obtain old medical records.  Initial Assessment:   Patient presents with dog bite to right forearm 2 days ago now with cellulitis, crepitus, and air in wound as per X-ray done at urgent care. Will check basic labs, re X-ray forearm, start IV antibiotics, and consult orthopedics who has recommended MRI forearm.  Differential Diagnosis:   Initial differential includes but is not limited to cellulitis, dog bite, necrotizing fasciitis  Independently Interpreted Test(s):   I have ordered and independently interpreted X-rays - see summary below.       <> Summary of X-Ray Reading(s): Forearm x-ray:  Wound with air present  Clinical Tests:   Lab Tests: Ordered and Reviewed  Radiological Study: Ordered and Reviewed  ED Management:  23:43  Patient labs unremarkable. X-ray does show some air in the wound. Discussed with internal medicine and ortho and will admit for IV antibiotics and MRI.  Other:   I have discussed this case with another  health care provider.       <> Summary of the Discussion: Orthopedic surgery, IM        Scribe Attestation:   Scribe #1: I performed the above scribed service and the documentation accurately describes the services I performed. I attest to the accuracy of the note.            I, Dr. Moses Maldonado III, personally performed the services described in this documentation. All medical record entries made by the scribe were at my direction and in my presence.  I have reviewed the chart and agree that the record reflects my personal performance and is accurate and complete. Moses Maldonado III, MD.  12:06 AM 09/24/2022         Clinical Impression:   Final diagnoses:  [W54.0XXA] Dog bite  [L03.113] Right forearm cellulitis (Primary)        ED Disposition Condition    Observation Stable                Moses Maldonado III, MD  09/24/22 0007

## 2022-09-24 NOTE — ASSESSMENT & PLAN NOTE
"Acute, on zosyn, will continue  Tetanus shot given at outside facility  Believes dog is uptodate on vaccines  MRI performed, ortho consulted, appreciate recs  MRI results,  "1. Soft tissue edema, enhancement, and soft tissue gas within the volar subcutaneous tissues of the right forearm.  Correlate for evidence of cellulitis.  No evidence of a rim enhancing fluid collection or evidence of deep fascial or muscular edema.  2. Complete tear of the flexor carpi ulnaris tendon with 4 cm retraction"  "

## 2022-09-25 VITALS
WEIGHT: 190.06 LBS | HEART RATE: 59 BPM | RESPIRATION RATE: 18 BRPM | SYSTOLIC BLOOD PRESSURE: 128 MMHG | TEMPERATURE: 98 F | DIASTOLIC BLOOD PRESSURE: 62 MMHG | HEIGHT: 76 IN | OXYGEN SATURATION: 100 % | BODY MASS INDEX: 23.14 KG/M2

## 2022-09-25 LAB
ALBUMIN SERPL BCP-MCNC: 3.5 G/DL (ref 3.5–5.2)
ALP SERPL-CCNC: 80 U/L (ref 55–135)
ALT SERPL W/O P-5'-P-CCNC: 53 U/L (ref 10–44)
ANION GAP SERPL CALC-SCNC: 7 MMOL/L (ref 8–16)
AST SERPL-CCNC: 48 U/L (ref 10–40)
BASOPHILS # BLD AUTO: 0.03 K/UL (ref 0–0.2)
BASOPHILS NFR BLD: 0.5 % (ref 0–1.9)
BILIRUB SERPL-MCNC: 0.6 MG/DL (ref 0.1–1)
BUN SERPL-MCNC: 9 MG/DL (ref 6–20)
CALCIUM SERPL-MCNC: 9 MG/DL (ref 8.7–10.5)
CHLORIDE SERPL-SCNC: 104 MMOL/L (ref 95–110)
CO2 SERPL-SCNC: 26 MMOL/L (ref 23–29)
CREAT SERPL-MCNC: 1.1 MG/DL (ref 0.5–1.4)
DIFFERENTIAL METHOD: NORMAL
EOSINOPHIL # BLD AUTO: 0.3 K/UL (ref 0–0.5)
EOSINOPHIL NFR BLD: 4.6 % (ref 0–8)
ERYTHROCYTE [DISTWIDTH] IN BLOOD BY AUTOMATED COUNT: 12.3 % (ref 11.5–14.5)
EST. GFR  (NO RACE VARIABLE): >60 ML/MIN/1.73 M^2
GLUCOSE SERPL-MCNC: 86 MG/DL (ref 70–110)
HCT VFR BLD AUTO: 43.2 % (ref 40–54)
HGB BLD-MCNC: 14.2 G/DL (ref 14–18)
IMM GRANULOCYTES # BLD AUTO: 0.01 K/UL (ref 0–0.04)
IMM GRANULOCYTES NFR BLD AUTO: 0.2 % (ref 0–0.5)
LYMPHOCYTES # BLD AUTO: 2.1 K/UL (ref 1–4.8)
LYMPHOCYTES NFR BLD: 36.5 % (ref 18–48)
MAGNESIUM SERPL-MCNC: 1.7 MG/DL (ref 1.6–2.6)
MCH RBC QN AUTO: 30.1 PG (ref 27–31)
MCHC RBC AUTO-ENTMCNC: 32.9 G/DL (ref 32–36)
MCV RBC AUTO: 92 FL (ref 82–98)
MONOCYTES # BLD AUTO: 0.9 K/UL (ref 0.3–1)
MONOCYTES NFR BLD: 14.5 % (ref 4–15)
NEUTROPHILS # BLD AUTO: 2.6 K/UL (ref 1.8–7.7)
NEUTROPHILS NFR BLD: 43.7 % (ref 38–73)
NRBC BLD-RTO: 0 /100 WBC
PHOSPHATE SERPL-MCNC: 4.2 MG/DL (ref 2.7–4.5)
PLATELET # BLD AUTO: 219 K/UL (ref 150–450)
PMV BLD AUTO: 10.3 FL (ref 9.2–12.9)
POTASSIUM SERPL-SCNC: 4.1 MMOL/L (ref 3.5–5.1)
PROT SERPL-MCNC: 6.3 G/DL (ref 6–8.4)
RBC # BLD AUTO: 4.71 M/UL (ref 4.6–6.2)
SODIUM SERPL-SCNC: 137 MMOL/L (ref 136–145)
WBC # BLD AUTO: 5.86 K/UL (ref 3.9–12.7)

## 2022-09-25 PROCEDURE — 85025 COMPLETE CBC W/AUTO DIFF WBC: CPT | Performed by: INTERNAL MEDICINE

## 2022-09-25 PROCEDURE — 36415 COLL VENOUS BLD VENIPUNCTURE: CPT | Performed by: INTERNAL MEDICINE

## 2022-09-25 PROCEDURE — G0378 HOSPITAL OBSERVATION PER HR: HCPCS

## 2022-09-25 PROCEDURE — 84100 ASSAY OF PHOSPHORUS: CPT | Performed by: INTERNAL MEDICINE

## 2022-09-25 PROCEDURE — 99217 PR OBSERVATION CARE DISCHARGE: ICD-10-PCS | Mod: ,,, | Performed by: STUDENT IN AN ORGANIZED HEALTH CARE EDUCATION/TRAINING PROGRAM

## 2022-09-25 PROCEDURE — 96366 THER/PROPH/DIAG IV INF ADDON: CPT

## 2022-09-25 PROCEDURE — 25000003 PHARM REV CODE 250: Performed by: INTERNAL MEDICINE

## 2022-09-25 PROCEDURE — 83735 ASSAY OF MAGNESIUM: CPT | Performed by: INTERNAL MEDICINE

## 2022-09-25 PROCEDURE — 99217 PR OBSERVATION CARE DISCHARGE: CPT | Mod: ,,, | Performed by: STUDENT IN AN ORGANIZED HEALTH CARE EDUCATION/TRAINING PROGRAM

## 2022-09-25 PROCEDURE — 80053 COMPREHEN METABOLIC PANEL: CPT | Performed by: INTERNAL MEDICINE

## 2022-09-25 PROCEDURE — 63600175 PHARM REV CODE 636 W HCPCS: Performed by: INTERNAL MEDICINE

## 2022-09-25 RX ORDER — HYDROCODONE BITARTRATE AND ACETAMINOPHEN 5; 325 MG/1; MG/1
1 TABLET ORAL EVERY 8 HOURS PRN
Qty: 9 TABLET | Refills: 0 | Status: SHIPPED | OUTPATIENT
Start: 2022-09-25 | End: 2022-09-28

## 2022-09-25 RX ORDER — AMOXICILLIN AND CLAVULANATE POTASSIUM 875; 125 MG/1; MG/1
1 TABLET, FILM COATED ORAL EVERY 12 HOURS
Qty: 10 TABLET | Refills: 0 | Status: SHIPPED | OUTPATIENT
Start: 2022-09-25 | End: 2022-09-30

## 2022-09-25 RX ADMIN — PIPERACILLIN SODIUM AND TAZOBACTAM SODIUM 4.5 G: 4; .5 INJECTION, POWDER, LYOPHILIZED, FOR SOLUTION INTRAVENOUS at 05:09

## 2022-09-25 RX ADMIN — HYDROCODONE BITARTRATE AND ACETAMINOPHEN 1 TABLET: 5; 325 TABLET ORAL at 08:09

## 2022-09-25 NOTE — H&P (VIEW-ONLY)
"[facility]  Orthopedics  Progress Note    Patient Name: Geovanni Lopez  MRN: [id number]  Admission Date: 9/23/2022  Hospital Length of Stay: 0 days  Attending Provider: Tobin Eden MD  Primary Care Provider: Primary Doctor No    Subjective:     Principal Problem:Dog bite    Principal Orthopedic Problem: right volar forearm dog bite with traumatic FCU laceration     Interval History: NAEON. VS wnl. Pain improving. Splinted and elevated    Review of patient's allergies indicates:  No Known Allergies    Current Facility-Administered Medications   Medication    acetaminophen tablet 650 mg    albuterol-ipratropium 2.5 mg-0.5 mg/3 mL nebulizer solution 3 mL    dextrose 10% bolus 125 mL    dextrose 10% bolus 250 mL    enoxaparin injection 40 mg    glucagon (human recombinant) injection 1 mg    glucose chewable tablet 16 g    glucose chewable tablet 24 g    HYDROcodone-acetaminophen 5-325 mg per tablet 1 tablet    LIDOcaine HCL 10 mg/ml (1%) injection 20 mL    melatonin tablet 6 mg    naloxone 0.4 mg/mL injection 0.02 mg    ondansetron disintegrating tablet 4 mg    piperacillin-tazobactam 4.5 g in sodium chloride 0.9% 100 mL IVPB (ready to mix system)    polyethylene glycol packet 17 g    prochlorperazine injection Soln 5 mg    simethicone chewable tablet 80 mg    sodium chloride 0.9% flush 10 mL    sodium chloride 0.9% flush 10 mL     Objective:     Vital Signs (Most Recent):  Temp: 97.5 °F (36.4 °C) (09/25/22 0443)  Pulse: (!) 49 (09/25/22 0443)  Resp: 20 (09/25/22 0443)  BP: 120/60 (09/25/22 0443)  SpO2: 99 % (09/25/22 0443)   Vital Signs (24h Range):  Temp:  [97.5 °F (36.4 °C)-98.6 °F (37 °C)] 97.5 °F (36.4 °C)  Pulse:  [49-68] 49  Resp:  [16-20] 20  SpO2:  [95 %-99 %] 99 %  BP: (117-131)/(60-72) 120/60     Weight: 86.2 kg (190 lb 0.6 oz)  Height: 6' 4" (193 cm)  Body mass index is 23.13 kg/m².      Intake/Output Summary (Last 24 hours) at 9/25/2022 0714  Last data filed at 9/24/2022 " 1800  Gross per 24 hour   Intake 1310 ml   Output --   Net 1310 ml       Ortho/SPM Exam  General Exam  General appearance: A&Ox3. NAD.   HEENT: Normocephalic, head atraumatic. Conjuntiva normal. EOMI. External appearance of nose, mouth, ears wnl.  Neck: Supple. Trachea midline.  Respiratory: Breathing unlabored on room air. Symmetric chest rise.   CV: Extremities warm and well perfused.  Neurologic: No focal motor or sensory deficits.   Psychatric: A&Ox3. Appropriate mood and affect.  Skin: Warm, dry, well perfused. No rash.    RUE exam  Dorsal blocking splitn in place, elevated on IV pole  Splint taken down  Right volar forearm wound packing pulled  Wound clean and dry, no drainage able to be expressed  Swellign and erythema improving  Minimally TTP  Motor and sensation intact radial/pin, median/AIN, ulnar distributions - except some decreased sensation to light touch around the area of the dog bite  Radial pulse 2+ brisk cap refill     Significant Labs: BMP:   Recent Labs   Lab 09/25/22  0441   GLU 86      K 4.1      CO2 26   BUN 9   CREATININE 1.1   CALCIUM 9.0   MG 1.7     CBC:   Recent Labs   Lab 09/23/22  2154 09/24/22  0504 09/25/22  0441   WBC 5.57 6.55 5.86   HGB 14.4 14.3 14.2   HCT 43.3 42.6 43.2    216 219     CRP:   Recent Labs   Lab 09/24/22  0810   CRP 19.6*     All pertinent labs within the past 24 hours have been reviewed.    Significant Imaging: I have reviewed and interpreted all pertinent imaging results/findings.    Assessment/Plan:     * Dog bite: Right forearm  27M sp dog bite to right volar forearm with traumatic complete FCU laceration and cellulitis. Bedside I&D of dog bite puncture wound 9/24/22.     Patient's cellulitis significant improved today. No fluctuance. Discussed with staff. From orthopedic standpoint patient can dc home on po abx in dorsal blocking splint. Follow up in hand clinic this week to discuss surgery for FCU tendon repair.             Tyra GARCIA  MD Laureen  Orthopedics  [facility] - Surgery

## 2022-09-25 NOTE — NURSING
Reviewed discharge instructions with patient including signs and symptoms to call physician or seek emergency care, referrals confirmed with patient on internal medicine and orthopedics, medications reviewed, awaiting [facility] bedside delivery, [facility] On call number provided, patient verbalized understanding of instructions and hard copy of discharge summary given.

## 2022-09-25 NOTE — PROGRESS NOTES
"[facility]  Orthopedics  Progress Note    Patient Name: Geovanni Lopez  MRN: [id number]  Admission Date: 9/23/2022  Hospital Length of Stay: 0 days  Attending Provider: Tobin Eden MD  Primary Care Provider: Primary Doctor No    Subjective:     Principal Problem:Dog bite    Principal Orthopedic Problem: right volar forearm dog bite with traumatic FCU laceration     Interval History: NAEON. VS wnl. Pain improving. Splinted and elevated    Review of patient's allergies indicates:  No Known Allergies    Current Facility-Administered Medications   Medication    acetaminophen tablet 650 mg    albuterol-ipratropium 2.5 mg-0.5 mg/3 mL nebulizer solution 3 mL    dextrose 10% bolus 125 mL    dextrose 10% bolus 250 mL    enoxaparin injection 40 mg    glucagon (human recombinant) injection 1 mg    glucose chewable tablet 16 g    glucose chewable tablet 24 g    HYDROcodone-acetaminophen 5-325 mg per tablet 1 tablet    LIDOcaine HCL 10 mg/ml (1%) injection 20 mL    melatonin tablet 6 mg    naloxone 0.4 mg/mL injection 0.02 mg    ondansetron disintegrating tablet 4 mg    piperacillin-tazobactam 4.5 g in sodium chloride 0.9% 100 mL IVPB (ready to mix system)    polyethylene glycol packet 17 g    prochlorperazine injection Soln 5 mg    simethicone chewable tablet 80 mg    sodium chloride 0.9% flush 10 mL    sodium chloride 0.9% flush 10 mL     Objective:     Vital Signs (Most Recent):  Temp: 97.5 °F (36.4 °C) (09/25/22 0443)  Pulse: (!) 49 (09/25/22 0443)  Resp: 20 (09/25/22 0443)  BP: 120/60 (09/25/22 0443)  SpO2: 99 % (09/25/22 0443)   Vital Signs (24h Range):  Temp:  [97.5 °F (36.4 °C)-98.6 °F (37 °C)] 97.5 °F (36.4 °C)  Pulse:  [49-68] 49  Resp:  [16-20] 20  SpO2:  [95 %-99 %] 99 %  BP: (117-131)/(60-72) 120/60     Weight: 86.2 kg (190 lb 0.6 oz)  Height: 6' 4" (193 cm)  Body mass index is 23.13 kg/m².      Intake/Output Summary (Last 24 hours) at 9/25/2022 0714  Last data filed at 9/24/2022 " 1800  Gross per 24 hour   Intake 1310 ml   Output --   Net 1310 ml       Ortho/SPM Exam  General Exam  General appearance: A&Ox3. NAD.   HEENT: Normocephalic, head atraumatic. Conjuntiva normal. EOMI. External appearance of nose, mouth, ears wnl.  Neck: Supple. Trachea midline.  Respiratory: Breathing unlabored on room air. Symmetric chest rise.   CV: Extremities warm and well perfused.  Neurologic: No focal motor or sensory deficits.   Psychatric: A&Ox3. Appropriate mood and affect.  Skin: Warm, dry, well perfused. No rash.    RUE exam  Dorsal blocking splitn in place, elevated on IV pole  Splint taken down  Right volar forearm wound packing pulled  Wound clean and dry, no drainage able to be expressed  Swellign and erythema improving  Minimally TTP  Motor and sensation intact radial/pin, median/AIN, ulnar distributions - except some decreased sensation to light touch around the area of the dog bite  Radial pulse 2+ brisk cap refill     Significant Labs: BMP:   Recent Labs   Lab 09/25/22  0441   GLU 86      K 4.1      CO2 26   BUN 9   CREATININE 1.1   CALCIUM 9.0   MG 1.7     CBC:   Recent Labs   Lab 09/23/22  2154 09/24/22  0504 09/25/22  0441   WBC 5.57 6.55 5.86   HGB 14.4 14.3 14.2   HCT 43.3 42.6 43.2    216 219     CRP:   Recent Labs   Lab 09/24/22  0810   CRP 19.6*     All pertinent labs within the past 24 hours have been reviewed.    Significant Imaging: I have reviewed and interpreted all pertinent imaging results/findings.    Assessment/Plan:     * Dog bite: Right forearm  27M sp dog bite to right volar forearm with traumatic complete FCU laceration and cellulitis. Bedside I&D of dog bite puncture wound 9/24/22.     Patient's cellulitis significant improved today. No fluctuance. Discussed with staff. From orthopedic standpoint patient can dc home on po abx in dorsal blocking splint. Follow up in hand clinic this week to discuss surgery for FCU tendon repair.             Tyra GARCIA  MD Laureen  Orthopedics  [facility] - Surgery

## 2022-09-25 NOTE — NURSING
Patient has been given discharge instructions and summary since 10am, at 1300 patient stated he was going to leave shortly and drive home, he refused a lyft, OC Nitin also spoke with patient.      1555 spoke with patient again, patient sitting at edge of bed, notified patient that I could get him a wheelchair, and that MD was asking why he had not discharged yet, patient stated he was leaving, patient remained on bed, IVELISSE Taveras notified of update.

## 2022-09-25 NOTE — ASSESSMENT & PLAN NOTE
27M sp dog bite to right volar forearm with traumatic complete FCU laceration and cellulitis. Bedside I&D of dog bite puncture wound 9/24/22.     Patient's cellulitis significant improved today. No fluctuance. Discussed with staff. From orthopedic standpoint patient can dc home on po abx in dorsal blocking splint. Follow up in hand clinic this week to discuss surgery for FCU tendon repair.

## 2022-09-25 NOTE — HOSPITAL COURSE
"Pt admitted to [facility] and started on IV zosyn. Ortho consulted: "felt that patient's cellulitis significant improved. No fluctuance. Discussed with staff. From orthopedic standpoint patient can dc home on po abx in dorsal blocking splint. Follow up in hand clinic this week to discuss surgery for FCU tendon repair." Pt continued to remain stable overnight and into 9/25. Pt deemed appropriate for discharge to complete PO augmentin at home, with ortho follow-up this week. Plan discussed with pt, who was agreeable and amenable; medications were discussed and reviewed, outpatient follow-up scheduled, ER precautions were given, all questions were answered to the pt's satisfaction, and Mr. Lopez was subsequently discharged.    "

## 2022-09-25 NOTE — DISCHARGE SUMMARY
"[facility]  Discharge Summary      Patient Name: Geovanni Lopez  MRN: [id number]  Patient Class: OP- Observation  Admission Date: 9/23/2022  Hospital Length of Stay: 0 days  Discharge Date and Time:  09/25/2022 11:13 AM  Attending Physician: Tobin Eden MD   Discharging Provider: Tobin Eden MD  Primary Care Provider: Primary Doctor [facility] Medicine Team: [facility] MED  Tobin Eden MD    HPI:   26 yo male with tobacco use presenting after being bitten by a dog two days ago on his right arm. He went to urgent care as the wound was bothering him, and XR showed "air in soft tissue." He believes the dog was up to date on his shots. He received a tetanus shot and abx at urgent care and abx which helped him. He was then told to present to the ED.    In ED, labs collected, ortho consulted, MRI ordered, medicine called for admission for abx.          * No surgery found *      Hospital Course:   Pt admitted to [facility] and started on IV zosyn. Ortho consulted: "felt that patient's cellulitis significant improved. No fluctuance. Discussed with staff. From orthopedic standpoint patient can dc home on po abx in dorsal blocking splint. Follow up in hand clinic this week to discuss surgery for FCU tendon repair." Pt continued to remain stable overnight and into 9/25. Pt deemed appropriate for discharge to complete PO augmentin at home, with ortho follow-up this week. Plan discussed with pt, who was agreeable and amenable; medications were discussed and reviewed, outpatient follow-up scheduled, ER precautions were given, all questions were answered to the pt's satisfaction, and Mr. Lopez was subsequently discharged.         Goals of Care Treatment Preferences:  Code Status: Full Code      Consults:   Consults (From admission, onward)        Status Ordering Provider     Inpatient consult to Orthopedic Surgery  Once        Provider:  (Not yet assigned)    Completed TOBIN EDEN    "       No new Assessment & Plan notes have been filed under this hospital service since the last note was generated.  Service: Hospital Medicine    Final Active Diagnoses:    Diagnosis Date Noted POA    PRINCIPAL PROBLEM:  Dog bite: Right forearm [W54.0XXA] 09/24/2022 Yes    Hyperglycemia [R73.9] 09/24/2022 Yes    Light tobacco smoker [F17.200] 09/24/2022 Yes    Alcohol use [Z72.89] 09/24/2022 Yes      Problems Resolved During this Admission:       Discharged Condition: stable    Disposition: Home or Self Care    Follow Up:   Follow-up Information     Referral sent to  clinic to establish care with a PCP Follow up.                     Patient Instructions:      Ambulatory referral/consult to Orthopedics   Standing Status: Future   Referral Priority: Routine Referral Type: Consultation   Requested Specialty: Orthopedic Surgery   Number of Visits Requested: 1     Ambulatory referral/consult to Internal Medicine   Standing Status: Future   Referral Priority: Routine Referral Type: Consultation   Referral Reason: Specialty Services Required   Requested Specialty: Internal Medicine   Number of Visits Requested: 1     Diet Adult Regular     Notify your health care provider if you experience any of the following:  increased confusion or weakness     Notify your health care provider if you experience any of the following:  persistent dizziness, light-headedness, or visual disturbances     Notify your health care provider if you experience any of the following:  worsening rash     Notify your health care provider if you experience any of the following:  severe persistent headache     Notify your health care provider if you experience any of the following:  difficulty breathing or increased cough     Notify your health care provider if you experience any of the following:  severe uncontrolled pain     Notify your health care provider if you experience any of the following:  persistent nausea and vomiting or diarrhea      Notify your health care provider if you experience any of the following:  temperature >100.4     Activity as tolerated       Significant Diagnostic Studies: Labs: All labs within the past 24 hours have been reviewed    Pending Diagnostic Studies:     None         Medications:  Reconciled Home Medications:      Medication List      START taking these medications    amoxicillin-clavulanate 875-125mg 875-125 mg per tablet  Commonly known as: AUGMENTIN  Take 1 tablet by mouth every 12 (twelve) hours. for 5 days     HYDROcodone-acetaminophen 5-325 mg per tablet  Commonly known as: NORCO  Take 1 tablet by mouth every 8 (eight) hours as needed for Pain.            Indwelling Lines/Drains at time of discharge:   Lines/Drains/Airways     None                 Time spent on the discharge of patient: 35 minutes         Tobin Eden MD  Attending Physician  Department of Hospital Medicine  Epic secure chat preferred, or ext. [phone]  9/25/2022

## 2022-09-25 NOTE — SUBJECTIVE & OBJECTIVE
"Principal Problem:Dog bite    Principal Orthopedic Problem: right volar forearm dog bite with traumatic FCU laceration     Interval History: NAEON. VS wnl. Pain improving. Splinted and elevated    Review of patient's allergies indicates:  No Known Allergies    Current Facility-Administered Medications   Medication    acetaminophen tablet 650 mg    albuterol-ipratropium 2.5 mg-0.5 mg/3 mL nebulizer solution 3 mL    dextrose 10% bolus 125 mL    dextrose 10% bolus 250 mL    enoxaparin injection 40 mg    glucagon (human recombinant) injection 1 mg    glucose chewable tablet 16 g    glucose chewable tablet 24 g    HYDROcodone-acetaminophen 5-325 mg per tablet 1 tablet    LIDOcaine HCL 10 mg/ml (1%) injection 20 mL    melatonin tablet 6 mg    naloxone 0.4 mg/mL injection 0.02 mg    ondansetron disintegrating tablet 4 mg    piperacillin-tazobactam 4.5 g in sodium chloride 0.9% 100 mL IVPB (ready to mix system)    polyethylene glycol packet 17 g    prochlorperazine injection Soln 5 mg    simethicone chewable tablet 80 mg    sodium chloride 0.9% flush 10 mL    sodium chloride 0.9% flush 10 mL     Objective:     Vital Signs (Most Recent):  Temp: 97.5 °F (36.4 °C) (09/25/22 0443)  Pulse: (!) 49 (09/25/22 0443)  Resp: 20 (09/25/22 0443)  BP: 120/60 (09/25/22 0443)  SpO2: 99 % (09/25/22 0443)   Vital Signs (24h Range):  Temp:  [97.5 °F (36.4 °C)-98.6 °F (37 °C)] 97.5 °F (36.4 °C)  Pulse:  [49-68] 49  Resp:  [16-20] 20  SpO2:  [95 %-99 %] 99 %  BP: (117-131)/(60-72) 120/60     Weight: 86.2 kg (190 lb 0.6 oz)  Height: 6' 4" (193 cm)  Body mass index is 23.13 kg/m².      Intake/Output Summary (Last 24 hours) at 9/25/2022 0714  Last data filed at 9/24/2022 1800  Gross per 24 hour   Intake 1310 ml   Output --   Net 1310 ml       Ortho/SPM Exam  General Exam  General appearance: A&Ox3. NAD.   HEENT: Normocephalic, head atraumatic. Conjuntiva normal. EOMI. External appearance of nose, mouth, ears wnl.  Neck: Supple. Trachea " midline.  Respiratory: Breathing unlabored on room air. Symmetric chest rise.   CV: Extremities warm and well perfused.  Neurologic: No focal motor or sensory deficits.   Psychatric: A&Ox3. Appropriate mood and affect.  Skin: Warm, dry, well perfused. No rash.    RUE exam  Dorsal blocking splitn in place, elevated on IV pole  Splint taken down  Right volar forearm wound packing pulled  Wound clean and dry, no drainage able to be expressed  Swellign and erythema improving  Minimally TTP  Motor and sensation intact radial/pin, median/AIN, ulnar distributions - except some decreased sensation to light touch around the area of the dog bite  Radial pulse 2+ brisk cap refill     Significant Labs: BMP:   Recent Labs   Lab 09/25/22  0441   GLU 86      K 4.1      CO2 26   BUN 9   CREATININE 1.1   CALCIUM 9.0   MG 1.7     CBC:   Recent Labs   Lab 09/23/22  2154 09/24/22  0504 09/25/22  0441   WBC 5.57 6.55 5.86   HGB 14.4 14.3 14.2   HCT 43.3 42.6 43.2    216 219     CRP:   Recent Labs   Lab 09/24/22  0810   CRP 19.6*     All pertinent labs within the past 24 hours have been reviewed.    Significant Imaging: I have reviewed and interpreted all pertinent imaging results/findings.

## 2022-09-26 ENCOUNTER — TELEPHONE (OUTPATIENT)
Dept: ORTHOPEDICS | Facility: CLINIC | Age: 27
End: 2022-09-26
Payer: COMMERCIAL

## 2022-09-26 NOTE — TELEPHONE ENCOUNTER
Spoke c pt. Offered and confirmed appt location & time c Dr. Dobbins 09/27/22. Pt expressed understanding & was thankful.

## 2022-09-27 ENCOUNTER — TELEPHONE (OUTPATIENT)
Dept: ORTHOPEDICS | Facility: CLINIC | Age: 27
End: 2022-09-27
Payer: COMMERCIAL

## 2022-09-27 ENCOUNTER — DOCUMENTATION ONLY (OUTPATIENT)
Dept: ORTHOPEDICS | Facility: CLINIC | Age: 27
End: 2022-09-27

## 2022-09-27 ENCOUNTER — OFFICE VISIT (OUTPATIENT)
Dept: ORTHOPEDICS | Facility: CLINIC | Age: 27
End: 2022-09-27
Payer: COMMERCIAL

## 2022-09-27 VITALS
DIASTOLIC BLOOD PRESSURE: 75 MMHG | SYSTOLIC BLOOD PRESSURE: 124 MMHG | HEART RATE: 73 BPM | WEIGHT: 190 LBS | BODY MASS INDEX: 23.14 KG/M2 | HEIGHT: 76 IN

## 2022-09-27 DIAGNOSIS — S41.151A DOG BITE OF ARM, RIGHT, INITIAL ENCOUNTER: ICD-10-CM

## 2022-09-27 DIAGNOSIS — S51.801A FLEXOR TENDON LACERATION OF FOREARM WITH OPEN WOUND, RIGHT, INITIAL ENCOUNTER: Primary | ICD-10-CM

## 2022-09-27 DIAGNOSIS — S56.221A FLEXOR TENDON LACERATION OF FOREARM WITH OPEN WOUND, RIGHT, INITIAL ENCOUNTER: Primary | ICD-10-CM
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
## 2022-09-27 PROCEDURE — 99204 OFFICE O/P NEW MOD 45 MIN: CPT | Mod: S$GLB,,, | Performed by: ORTHOPAEDIC SURGERY

## 2022-09-27 PROCEDURE — 3008F BODY MASS INDEX DOCD: CPT | Mod: CPTII,S$GLB,, | Performed by: ORTHOPAEDIC SURGERY

## 2022-09-27 PROCEDURE — 99204 PR OFFICE/OUTPT VISIT, NEW, LEVL IV, 45-59 MIN: ICD-10-PCS | Mod: S$GLB,,, | Performed by: ORTHOPAEDIC SURGERY

## 2022-09-27 PROCEDURE — 3074F SYST BP LT 130 MM HG: CPT | Mod: CPTII,S$GLB,, | Performed by: ORTHOPAEDIC SURGERY

## 2022-09-27 PROCEDURE — 3044F HG A1C LEVEL LT 7.0%: CPT | Mod: CPTII,S$GLB,, | Performed by: ORTHOPAEDIC SURGERY

## 2022-09-27 PROCEDURE — 3078F PR MOST RECENT DIASTOLIC BLOOD PRESSURE < 80 MM HG: ICD-10-PCS | Mod: CPTII,S$GLB,, | Performed by: ORTHOPAEDIC SURGERY

## 2022-09-27 PROCEDURE — 3008F PR BODY MASS INDEX (BMI) DOCUMENTED: ICD-10-PCS | Mod: CPTII,S$GLB,, | Performed by: ORTHOPAEDIC SURGERY

## 2022-09-27 PROCEDURE — 3078F DIAST BP <80 MM HG: CPT | Mod: CPTII,S$GLB,, | Performed by: ORTHOPAEDIC SURGERY

## 2022-09-27 PROCEDURE — 99999 PR PBB SHADOW E&M-EST. PATIENT-LVL III: CPT | Mod: PBBFAC,,, | Performed by: ORTHOPAEDIC SURGERY

## 2022-09-27 PROCEDURE — 3074F PR MOST RECENT SYSTOLIC BLOOD PRESSURE < 130 MM HG: ICD-10-PCS | Mod: CPTII,S$GLB,, | Performed by: ORTHOPAEDIC SURGERY

## 2022-09-27 PROCEDURE — 3044F PR MOST RECENT HEMOGLOBIN A1C LEVEL <7.0%: ICD-10-PCS | Mod: CPTII,S$GLB,, | Performed by: ORTHOPAEDIC SURGERY

## 2022-09-27 PROCEDURE — 99999 PR PBB SHADOW E&M-EST. PATIENT-LVL III: ICD-10-PCS | Mod: PBBFAC,,, | Performed by: ORTHOPAEDIC SURGERY

## 2022-09-27 RX ORDER — IBUPROFEN 600 MG/1
600 TABLET ORAL 3 TIMES DAILY PRN
Qty: 45 TABLET | Refills: 0 | Status: SHIPPED | OUTPATIENT
Start: 2022-09-27

## 2022-09-27 RX ORDER — TRAMADOL HYDROCHLORIDE 50 MG/1
50 TABLET ORAL EVERY 6 HOURS PRN
Qty: 20 TABLET | Refills: 0 | Status: SHIPPED | OUTPATIENT
Start: 2022-09-27

## 2022-09-27 NOTE — TELEPHONE ENCOUNTER
Spoke c pt. Informed pt of 9 AM arrival time for 9/28/22 surgery at the [facility]. Reminded pt of NPO status. Pt expressed understanding & was thankful.

## 2022-09-27 NOTE — PROGRESS NOTES
"Subjective:      Patient ID: Geovanni Lopez is a 27 y.o. male.    Chief Complaint: Pain of the Right Forearm      HPI  Geovanni Lopez is a  27 y.o. male presenting today for ED follow up right upper extremity dog bite. Injury sustained 9/21/22. He presented to the ED two days later due to erythema, light drainage, pain. The bite is located over the mid volar forearm. Dog is UTD on vaccinations. MRI with complete laceration of the FCU.  He was placed in a dorsal blocking splint and admitted to the hospital for IV abx. He was d/c on Augmentin. He reports he is doing okay.      Review of patient's allergies indicates:  No Known Allergies      Current Outpatient Medications   Medication Sig Dispense Refill    amoxicillin-clavulanate 875-125mg (AUGMENTIN) 875-125 mg per tablet Take 1 tablet by mouth every 12 (twelve) hours. for 5 days 10 tablet 0    HYDROcodone-acetaminophen (NORCO) 5-325 mg per tablet Take 1 tablet by mouth every 8 (eight) hours as needed for Pain. 9 tablet 0    ibuprofen (ADVIL,MOTRIN) 600 MG tablet Take 1 tablet (600 mg total) by mouth 3 (three) times daily as needed for Pain. 45 tablet 0    traMADoL (ULTRAM) 50 mg tablet Take 1 tablet (50 mg total) by mouth every 6 (six) hours as needed for Pain. 20 tablet 0     No current facility-administered medications for this visit.       Past Medical History:   Diagnosis Date    Scoliosis        Past Surgical History:   Procedure Laterality Date    WISDOM TOOTH EXTRACTION         Review of Systems:  Constitutional: Negative for chills and fever.   Respiratory: Negative for cough and shortness of breath.    Gastrointestinal: Negative for nausea and vomiting.   Skin: Negative for rash.   Neurological: Negative for dizziness and headaches.   Psychiatric/Behavioral: Negative for depression.   MSK as in HPI       OBJECTIVE:     PHYSICAL EXAM:  /75   Pulse 73   Ht 6' 4" (1.93 m)   Wt 86.2 kg (190 lb)   BMI 23.13 kg/m²     GEN:  NAD, " well-developed, well-groomed.  NEURO: Awake, alert, and oriented. Normal attention and concentration.    PSYCH: Normal mood and affect. Behavior is normal.  HEENT: No cervical lymphadenopathy noted.  CARDIOVASCULAR: Radial pulses 2+ bilaterally. No LE edema noted.  PULMONARY: Breath sounds normal. No respiratory distress.  SKIN: Intact, no rashes.      MSK:   RUE:  Splint removed. There is a puncture wound to the mid volar forearm. No drainage. No significant erythema or edema present. Good active ROM of the wrist and fingers. AIN/PIN/Radial/Median/Ulnar Nerves assessed in isolation without deficit. Radial & Ulnar arteries palpated 2+. Capillary Refill <3s.      RADIOGRAPHS:  Xray right forearm 9/23/22   Impression:     There is no evidence of fracture or subluxation.     Soft tissue injury involving the anteromedial right forearm with air in the soft tissues consistent with history of penetrating animal bite injury. No radiopaque foreign body seen.    MRI right forearm 9/24/22   Impression:     1. Soft tissue edema, enhancement, and soft tissue gas within the volar subcutaneous tissues of the right forearm.  Correlate for evidence of cellulitis.  No evidence of a rim enhancing fluid collection or evidence of deep fascial or muscular edema.  2. Complete tear of the flexor carpi ulnaris tendon with 4 cm retraction.    Comments: I have personally reviewed the imaging and I agree with the above radiologist's report.    ASSESSMENT/PLAN:       ICD-10-CM ICD-9-CM   1. Flexor tendon laceration of forearm with open wound, right, initial encounter  S56.221A 881.20    S51.801A    2. Dog bite of arm, right, initial encounter  S41.151A 884.0    W54.0XXA E906.0       Orders Placed This Encounter    traMADoL (ULTRAM) 50 mg tablet    ibuprofen (ADVIL,MOTRIN) 600 MG tablet    Case Request Operating Room: REPAIR, TENDON, FLEXOR, right forearm, INCISION AND DRAINAGE, UPPER EXTREMITY, right     No orders of the defined types were  placed in this encounter.       Plan:   Plan for surgery for right upper extremity I&D and flexor tendon, FCU, repair. Consents reviewed and signed in clinic all questions answered.   Pt placed in right plaster dorsal blocking splint with wrist and fingers in slight flexion   RTC PO       The patient indicates understanding of these issues and agrees to the plan.    Selena Gonsales PA-C  [facility]   [facility]  [place], [place]

## 2022-09-27 NOTE — TELEPHONE ENCOUNTER
Attempted to reach pt to inform of LATER arrival time for surgery 09/28/22, but VM is full. Pt needs to arrive at 9am for his surgery, not 6am.     Will keep trying to reach him today.

## 2022-09-28 ENCOUNTER — ANESTHESIA EVENT (OUTPATIENT)
Dept: SURGERY | Facility: OTHER | Age: 27
End: 2022-09-28
Payer: COMMERCIAL

## 2022-09-28 ENCOUNTER — HOSPITAL ENCOUNTER (OUTPATIENT)
Facility: OTHER | Age: 27
Discharge: HOME | End: 2022-09-28
Attending: ORTHOPAEDIC SURGERY | Admitting: ORTHOPAEDIC SURGERY
Payer: COMMERCIAL

## 2022-09-28 ENCOUNTER — ANESTHESIA (OUTPATIENT)
Dept: SURGERY | Facility: OTHER | Age: 27
End: 2022-09-28
Payer: COMMERCIAL

## 2022-09-28 VITALS
HEIGHT: 76 IN | HEART RATE: 60 BPM | RESPIRATION RATE: 15 BRPM | BODY MASS INDEX: 23.14 KG/M2 | OXYGEN SATURATION: 96 % | WEIGHT: 190 LBS | DIASTOLIC BLOOD PRESSURE: 69 MMHG | TEMPERATURE: 98 F | SYSTOLIC BLOOD PRESSURE: 134 MMHG
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
## 2022-09-28 DIAGNOSIS — S51.801A FLEXOR TENDON LACERATION OF FOREARM WITH OPEN WOUND, RIGHT, INITIAL ENCOUNTER: ICD-10-CM

## 2022-09-28 DIAGNOSIS — S56.221A FLEXOR TENDON LACERATION OF FOREARM WITH OPEN WOUND, RIGHT, INITIAL ENCOUNTER: ICD-10-CM

## 2022-09-28 DIAGNOSIS — S66.919A: ICD-10-CM

## 2022-09-28 PROBLEM — S41.151A DOG BITE OF ARM, RIGHT, INITIAL ENCOUNTER: Status: ACTIVE | Noted: 2022-09-24

## 2022-09-28 PROCEDURE — 87206 SMEAR FLUORESCENT/ACID STAI: CPT | Performed by: ORTHOPAEDIC SURGERY

## 2022-09-28 PROCEDURE — 37000009 HC ANESTHESIA EA ADD 15 MINS: Performed by: ORTHOPAEDIC SURGERY

## 2022-09-28 PROCEDURE — 87070 CULTURE OTHR SPECIMN AEROBIC: CPT | Performed by: ORTHOPAEDIC SURGERY

## 2022-09-28 PROCEDURE — 63600175 PHARM REV CODE 636 W HCPCS: Performed by: NURSE ANESTHETIST, CERTIFIED REGISTERED

## 2022-09-28 PROCEDURE — 36000706: Performed by: ORTHOPAEDIC SURGERY

## 2022-09-28 PROCEDURE — 25000003 PHARM REV CODE 250: Performed by: NURSE ANESTHETIST, CERTIFIED REGISTERED

## 2022-09-28 PROCEDURE — 37000008 HC ANESTHESIA 1ST 15 MINUTES: Performed by: ORTHOPAEDIC SURGERY

## 2022-09-28 PROCEDURE — 25260 REPAIR FOREARM TENDON/MUSCLE: CPT | Mod: RT,,, | Performed by: ORTHOPAEDIC SURGERY

## 2022-09-28 PROCEDURE — 71000015 HC POSTOP RECOV 1ST HR: Performed by: ORTHOPAEDIC SURGERY

## 2022-09-28 PROCEDURE — 25260 PR REFOREARM TEND/MUSC,FLEX,PRIM,EA: ICD-10-PCS | Mod: RT,,, | Performed by: ORTHOPAEDIC SURGERY

## 2022-09-28 PROCEDURE — 25000003 PHARM REV CODE 250: Performed by: STUDENT IN AN ORGANIZED HEALTH CARE EDUCATION/TRAINING PROGRAM

## 2022-09-28 PROCEDURE — 63600175 PHARM REV CODE 636 W HCPCS

## 2022-09-28 PROCEDURE — 87116 MYCOBACTERIA CULTURE: CPT | Performed by: ORTHOPAEDIC SURGERY

## 2022-09-28 PROCEDURE — 63600175 PHARM REV CODE 636 W HCPCS: Performed by: ANESTHESIOLOGY

## 2022-09-28 PROCEDURE — 71000016 HC POSTOP RECOV ADDL HR: Performed by: ORTHOPAEDIC SURGERY

## 2022-09-28 PROCEDURE — 36000707: Performed by: ORTHOPAEDIC SURGERY

## 2022-09-28 PROCEDURE — 87102 FUNGUS ISOLATION CULTURE: CPT | Performed by: ORTHOPAEDIC SURGERY

## 2022-09-28 PROCEDURE — 87075 CULTR BACTERIA EXCEPT BLOOD: CPT | Performed by: ORTHOPAEDIC SURGERY

## 2022-09-28 PROCEDURE — 87205 SMEAR GRAM STAIN: CPT | Performed by: ORTHOPAEDIC SURGERY

## 2022-09-28 PROCEDURE — 63600175 PHARM REV CODE 636 W HCPCS: Performed by: STUDENT IN AN ORGANIZED HEALTH CARE EDUCATION/TRAINING PROGRAM

## 2022-09-28 PROCEDURE — 76942 ECHO GUIDE FOR BIOPSY: CPT | Performed by: ANESTHESIOLOGY

## 2022-09-28 PROCEDURE — 25000003 PHARM REV CODE 250: Performed by: ORTHOPAEDIC SURGERY

## 2022-09-28 RX ORDER — SODIUM CHLORIDE 0.9 % (FLUSH) 0.9 %
3 SYRINGE (ML) INJECTION
Status: DISCONTINUED | OUTPATIENT
Start: 2022-09-28 | End: 2022-09-28 | Stop reason: HOSPADM

## 2022-09-28 RX ORDER — OXYCODONE HYDROCHLORIDE 5 MG/1
5 TABLET ORAL
Status: DISCONTINUED | OUTPATIENT
Start: 2022-09-28 | End: 2022-09-28 | Stop reason: HOSPADM

## 2022-09-28 RX ORDER — HYDROMORPHONE HYDROCHLORIDE 2 MG/ML
0.4 INJECTION, SOLUTION INTRAMUSCULAR; INTRAVENOUS; SUBCUTANEOUS EVERY 5 MIN PRN
Status: DISCONTINUED | OUTPATIENT
Start: 2022-09-28 | End: 2022-09-28 | Stop reason: HOSPADM

## 2022-09-28 RX ORDER — CEFAZOLIN SODIUM 1 G/3ML
2 INJECTION, POWDER, FOR SOLUTION INTRAMUSCULAR; INTRAVENOUS
Status: COMPLETED | OUTPATIENT
Start: 2022-09-28 | End: 2022-09-28

## 2022-09-28 RX ORDER — MEPERIDINE HYDROCHLORIDE 25 MG/ML
12.5 INJECTION INTRAMUSCULAR; INTRAVENOUS; SUBCUTANEOUS ONCE AS NEEDED
Status: DISCONTINUED | OUTPATIENT
Start: 2022-09-28 | End: 2022-09-28 | Stop reason: HOSPADM

## 2022-09-28 RX ORDER — PROCHLORPERAZINE EDISYLATE 5 MG/ML
5 INJECTION INTRAMUSCULAR; INTRAVENOUS EVERY 30 MIN PRN
Status: DISCONTINUED | OUTPATIENT
Start: 2022-09-28 | End: 2022-09-28 | Stop reason: HOSPADM

## 2022-09-28 RX ORDER — LIDOCAINE HYDROCHLORIDE 20 MG/ML
INJECTION INTRAVENOUS
Status: DISCONTINUED | OUTPATIENT
Start: 2022-09-28 | End: 2022-09-28

## 2022-09-28 RX ORDER — SODIUM CHLORIDE 0.9 G/100ML
IRRIGANT IRRIGATION
Status: DISCONTINUED | OUTPATIENT
Start: 2022-09-28 | End: 2022-09-28 | Stop reason: HOSPADM

## 2022-09-28 RX ORDER — BACITRACIN ZINC 500 UNIT/G
OINTMENT (GRAM) TOPICAL
Status: DISCONTINUED | OUTPATIENT
Start: 2022-09-28 | End: 2022-09-28 | Stop reason: HOSPADM

## 2022-09-28 RX ORDER — MUPIROCIN 20 MG/G
OINTMENT TOPICAL
Status: DISCONTINUED | OUTPATIENT
Start: 2022-09-28 | End: 2022-09-28 | Stop reason: HOSPADM

## 2022-09-28 RX ORDER — ROPIVACAINE HYDROCHLORIDE 5 MG/ML
INJECTION, SOLUTION EPIDURAL; INFILTRATION; PERINEURAL
Status: COMPLETED | OUTPATIENT
Start: 2022-09-28 | End: 2022-09-28

## 2022-09-28 RX ORDER — PROPOFOL 10 MG/ML
VIAL (ML) INTRAVENOUS
Status: DISCONTINUED | OUTPATIENT
Start: 2022-09-28 | End: 2022-09-28

## 2022-09-28 RX ORDER — PROPOFOL 10 MG/ML
VIAL (ML) INTRAVENOUS CONTINUOUS PRN
Status: DISCONTINUED | OUTPATIENT
Start: 2022-09-28 | End: 2022-09-28

## 2022-09-28 RX ORDER — FENTANYL CITRATE 50 UG/ML
INJECTION, SOLUTION INTRAMUSCULAR; INTRAVENOUS
Status: DISCONTINUED | OUTPATIENT
Start: 2022-09-28 | End: 2022-09-28

## 2022-09-28 RX ORDER — MIDAZOLAM HYDROCHLORIDE 1 MG/ML
INJECTION INTRAMUSCULAR; INTRAVENOUS
Status: DISCONTINUED | OUTPATIENT
Start: 2022-09-28 | End: 2022-09-28

## 2022-09-28 RX ADMIN — PROPOFOL 100 MCG/KG/MIN: 10 INJECTION, EMULSION INTRAVENOUS at 12:09

## 2022-09-28 RX ADMIN — ROPIVACAINE HYDROCHLORIDE 30 ML: 5 INJECTION, SOLUTION EPIDURAL; INFILTRATION; PERINEURAL at 11:09

## 2022-09-28 RX ADMIN — CEFAZOLIN 2 G: 330 INJECTION, POWDER, FOR SOLUTION INTRAMUSCULAR; INTRAVENOUS at 12:09

## 2022-09-28 RX ADMIN — GLYCOPYRROLATE 0.2 MG: 0.2 INJECTION, SOLUTION INTRAMUSCULAR; INTRAVITREAL at 12:09

## 2022-09-28 RX ADMIN — LIDOCAINE HYDROCHLORIDE 50 MG: 20 INJECTION, SOLUTION INTRAVENOUS at 12:09

## 2022-09-28 RX ADMIN — FENTANYL CITRATE 100 MCG: 50 INJECTION, SOLUTION INTRAMUSCULAR; INTRAVENOUS at 11:09

## 2022-09-28 RX ADMIN — PROPOFOL 100 MG: 10 INJECTION, EMULSION INTRAVENOUS at 12:09

## 2022-09-28 RX ADMIN — SODIUM CHLORIDE, SODIUM LACTATE, POTASSIUM CHLORIDE, AND CALCIUM CHLORIDE: .6; .31; .03; .02 INJECTION, SOLUTION INTRAVENOUS at 11:09

## 2022-09-28 RX ADMIN — MUPIROCIN: 20 OINTMENT TOPICAL at 09:09

## 2022-09-28 RX ADMIN — MIDAZOLAM HYDROCHLORIDE 2 MG: 1 INJECTION, SOLUTION INTRAMUSCULAR; INTRAVENOUS at 11:09

## 2022-09-28 NOTE — PLAN OF CARE
Geovanni Lopez has met all discharge criteria from Phase II. Vital Signs are stable, ambulating  without difficulty. Discharge instructions given, patient verbalized understanding. Discharged from facility via wheelchair in stable condition.

## 2022-09-28 NOTE — OP NOTE
[facility] ([facility]  Surgery Department  Operative Note    SUMMARY     Date of Procedure: 9/28/2022     Procedure: Procedure(s) (LRB):  REPAIR, TENDON, FLEXOR, right forearm (Right)  INCISION AND DRAINAGE, UPPER EXTREMITY, right (Right)     Surgeon(s) and Role:     * Adwoa Nelson MD - Primary    Assisting Surgeon: Evy BEDOLLA MD    Pre-Operative Diagnosis: Flexor tendon laceration of forearm with open wound, right, initial encounter [S56.221A, S51.801A]  Dog bite of arm, right, initial encounter [S41.151A, W54.0XXA]    Post-Operative Diagnosis: Post-Op Diagnosis Codes:     * Flexor tendon laceration of forearm with open wound, right, initial encounter [S56.221A, S51.801A]     * Dog bite of arm, right, initial encounter [S41.151A, W54.0XXA]    Anesthesia: Regional    Technical Procedures Used: surgery    Description of the Findings of the Procedure: Indication for procedure: Mr Lopez is a 28 yo male s/p dog bite to the right forearm with tendon involvement.  Patient was admitted to the hospital for IV antibiotics he was discharged referred to clinic MRI reviewed which showed flexor tendon laceration patient was treated with a tetanus in the ED risks benefits were explained to the patient in clinic consents were signed in clinic    Procedure in detail the correct site was marked with the patient's participation in the holding area the patient underwent regional anesthesia was brought to the operating room placed in supine position underwent MAC anesthesia well-padded nonsterile tourniquet was placed on the right upper extremity right upper extremities prepped draped normal sterile fashion a time-out was conducted for the correct procedure to be indicated IV antibiotics were given patient preoperatively tourniquet was insufflated 250 mmHg an extensile incision for the wound was made the original wound was ellipsed which measured roughly 1 cm in length it was ellipsed there was a cyst in fairly decent  sized defect that went down to the bone from this dog bite this was debrided sharply with Littler scissors of note the flexor tendon that was noted on the MRI which was torn was also seen pictures were taken to demonstrate this flexor tendon laceration from the dog bite the ends of the flexor tendon were debrided after the areas irrigated copious amounts normal saline and cultures were taken flexor tendon was then approximated and repaired with Prolene suture in a running nylon suture was stat repair the areas in it irrigated once again skin was closed with suture patient was placed in a well-padded splint tolerated suture was brought to cover area in stable condition     Postop plans patient keep the dressing clean dry intact will see the patient back at 1 week for wound check 2 weeks to start therapy    Significant Surgical Tasks Conducted by the Assistant(s), if Applicable: retraction    Complications: No    Estimated Blood Loss (EBL): * No values recorded between 9/28/2022 12:48 PM and 9/28/2022  1:46 PM *           Implants: * No implants in log *    Specimens:   Specimen (24h ago, onward)      None                    Condition: Good    Disposition: PACU - hemodynamically stable.    Attestation: I performed the procedure.    Discharge Note    SUMMARY     Admit Date: 9/28/2022    Discharge Date and Time: No discharge date for patient encounter.    Hospital Course (synopsis of major diagnoses, care, treatment, and services provided during the course of the hospital stay): surgery     Final Diagnosis: Post-Op Diagnosis Codes:     * Flexor tendon laceration of forearm with open wound, right, initial encounter [S56.221A, S51.801A]     * Dog bite of arm, right, initial encounter [S41.151A, W54.0XXA]    Disposition: Home or Self Care    Follow Up/Patient Instructions:     Medications:  Reconciled Home Medications:      Medication List        CONTINUE taking these medications      amoxicillin-clavulanate 875-125mg  875-125 mg per tablet  Commonly known as: AUGMENTIN  Take 1 tablet by mouth every 12 (twelve) hours. for 5 days     HYDROcodone-acetaminophen 5-325 mg per tablet  Commonly known as: NORCO  Take 1 tablet by mouth every 8 (eight) hours as needed for Pain.     traMADoL 50 mg tablet  Commonly known as: ULTRAM  Take 1 tablet (50 mg total) by mouth every 6 (six) hours as needed for Pain.            ASK your doctor about these medications      ibuprofen 600 MG tablet  Commonly known as: ADVIL,MOTRIN  Take 1 tablet (600 mg total) by mouth 3 (three) times daily as needed for Pain.            Discharge Procedure Orders   Ambulatory referral/consult to Physical/Occupational Therapy   Standing Status: Future   Referral Priority: Routine Referral Type: Occupational Therapy   Referral Reason: Specialty Services Required   Requested Specialty: Occupational Therapy   Number of Visits Requested: 1     Diet general     Call MD for:  temperature >100.4     Call MD for:  persistent nausea and vomiting     Call MD for:  severe uncontrolled pain     Call MD for:  difficulty breathing, headache or visual disturbances     Call MD for:  redness, tenderness, or signs of infection (pain, swelling, redness, odor or green/yellow discharge around incision site)     Call MD for:  hives     Call MD for:  persistent dizziness or light-headedness     Call MD for:  extreme fatigue     Leave dressing on - Keep it clean, dry, and intact until clinic visit     Weight bearing restrictions (specify)   Order Comments: Non weight bearing operative extremity in splint

## 2022-09-28 NOTE — ANESTHESIA PROCEDURE NOTES
Peripheral Block    Patient location during procedure: pre-op    Reason for block: primary anesthetic    Diagnosis: right hand pain   Start time: 9/28/2022 11:18 AM  Timeout: 9/28/2022 11:15 AM   End time: 9/28/2022 11:18 AM    Staffing  Authorizing Provider: Chris Lantigua MD  Performing Provider: Chris Lantigua MD    Preanesthetic Checklist  Completed: patient identified, IV checked, site marked, risks and benefits discussed, surgical consent, monitors and equipment checked, pre-op evaluation and timeout performed  Peripheral Block  Patient position: supine  Prep: ChloraPrep  Patient monitoring: heart rate, cardiac monitor, continuous pulse ox, continuous capnometry and frequent blood pressure checks  Block type: supraclavicular  Laterality: right  Injection technique: single shot  Needle  Needle type: Stimuplex   Needle gauge: 22 G  Needle length: 2 in  Needle localization: anatomical landmarks and ultrasound guidance   -ultrasound image captured on disc.  Assessment  Injection assessment: negative aspiration, negative parasthesia and local visualized surrounding nerve  Paresthesia pain: none  Heart rate change: no  Slow fractionated injection: yes  Pain Tolerance: comfortable throughout block and no complaints  Medications:    Medications: ropivacaine (NAROPIN) injection 0.5% - Perineural   30 mL - 9/28/2022 11:18:00 AM    Additional Notes  VSS.  [facility] RN monitoring vitals throughout procedure.  Patient tolerated procedure well.

## 2022-09-28 NOTE — ANESTHESIA POSTPROCEDURE EVALUATION
Anesthesia Post Evaluation    Patient: Geovanni Lopez    Procedure(s) Performed: Procedure(s) (LRB):  REPAIR, TENDON, FLEXOR, right forearm (Right)  INCISION AND DRAINAGE, UPPER EXTREMITY, right (Right)    Final Anesthesia Type: regional      Patient location during evaluation: OPS  Patient participation: Yes- Able to Participate  Level of consciousness: awake and alert  Post-procedure vital signs: reviewed and stable  Pain management: adequate  Airway patency: patent    PONV status at discharge: No PONV  Anesthetic complications: no      Cardiovascular status: blood pressure returned to baseline and hemodynamically stable  Respiratory status: room air, unassisted and spontaneous ventilation  Hydration status: euvolemic  Follow-up not needed.          Vitals Value Taken Time   /80 09/28/22 0936   Temp 36.4 °C (97.6 °F) 09/28/22 0936   Pulse 60 09/28/22 0936   Resp 16 09/28/22 0936   SpO2 100 % 09/28/22 0936         No case tracking events are documented in the log.      Pain/Jef Score: No data recorded

## 2022-09-28 NOTE — ANESTHESIA PREPROCEDURE EVALUATION
09/28/2022  Geovanni Lopez is a 27 y.o., male.      Pre-op Assessment    I have reviewed the Patient Summary Reports.     I have reviewed the Nursing Notes. I have reviewed the NPO Status.   I have reviewed the Medications.     Review of Systems  Anesthesia Hx:  No problems with previous Anesthesia    Social:  Smoker    Cardiovascular:   Exercise tolerance: good    Pulmonary:  Pulmonary Normal    Hepatic/GI:  Hepatic/GI Normal    Endocrine:  Endocrine Normal        Physical Exam  General: Well nourished, Cooperative and Alert    Airway:  Mallampati: II   Mouth Opening: Normal  TM Distance: Normal  Tongue: Normal  Neck ROM: Normal ROM    Dental:  Intact        Anesthesia Plan  Type of Anesthesia, risks & benefits discussed:    Anesthesia Type: MAC  Intra-op Monitoring Plan: Standard ASA Monitors  Post Op Pain Control Plan: multimodal analgesia  Induction:  IV  Informed Consent: Informed consent signed with the Patient and all parties understand the risks and agree with anesthesia plan.  All questions answered.   ASA Score: 1 Emergent    Ready For Surgery From Anesthesia Perspective.     .

## 2022-09-28 NOTE — BRIEF OP NOTE
[facility] ([facility])  Brief Operative Note    Surgery Date: 9/28/2022     Surgeon(s) and Role:     * Adwoa Nelson MD - Primary    Assisting Surgeon: None    Pre-op Diagnosis:  Flexor tendon laceration of forearm with open wound, right, initial encounter [S56.221A, S51.801A]  Dog bite of arm, right, initial encounter [S41.151A, W54.0XXA]    Post-op Diagnosis:  Post-Op Diagnosis Codes:     * Flexor tendon laceration of forearm with open wound, right, initial encounter [S56.221A, S51.801A]     * Dog bite of arm, right, initial encounter [S41.151A, W54.0XXA]    Procedure(s) (LRB):  REPAIR, TENDON, FLEXOR, right forearm (Right)  INCISION AND DRAINAGE, UPPER EXTREMITY, right (Right)    Anesthesia: Regional    Operative Findings: see op note    Estimated Blood Loss: minimal         Specimens:   Specimen (24h ago, onward)      None              Discharge Note    OUTCOME: Patient tolerated treatment/procedure well without complication and is now ready for discharge.    DISPOSITION: Home or Self Care    FINAL DIAGNOSIS:  Flexor tendon laceration of forearm with open wound, right, initial encounter    FOLLOWUP: In clinic    DISCHARGE INSTRUCTIONS:    Discharge Procedure Orders   Ambulatory referral/consult to Physical/Occupational Therapy   Standing Status: Future   Referral Priority: Routine Referral Type: Occupational Therapy   Referral Reason: Specialty Services Required   Requested Specialty: Occupational Therapy   Number of Visits Requested: 1     Diet general     Call MD for:  temperature >100.4     Call MD for:  persistent nausea and vomiting     Call MD for:  severe uncontrolled pain     Call MD for:  difficulty breathing, headache or visual disturbances     Call MD for:  redness, tenderness, or signs of infection (pain, swelling, redness, odor or green/yellow discharge around incision site)     Call MD for:  hives     Call MD for:  persistent dizziness or light-headedness     Call MD for:  extreme  fatigue     Leave dressing on - Keep it clean, dry, and intact until clinic visit     Weight bearing restrictions (specify)   Order Comments: Non weight bearing operative extremity in splint

## 2022-09-29 ENCOUNTER — TELEPHONE (OUTPATIENT)
Dept: ORTHOPEDICS | Facility: CLINIC | Age: 27
End: 2022-09-29
Payer: COMMERCIAL

## 2022-09-29 NOTE — TELEPHONE ENCOUNTER
----- Message from Cristal Saini sent at 9/29/2022  4:14 PM CDT -----  Name of Who is Calling:JACI KOEHLER [[id number]]              What is the request in detail:Patient is requesting a call back to confirm if he can fly in a plane after surgery.               Can the clinic reply by [facility]:no              [phone]

## 2022-09-29 NOTE — TELEPHONE ENCOUNTER
Spoke with patient he wanted to know if he was able to fly home advised he should wait a 3/5 days after surgery to reduce any blood clots. Patient advised he wants to go home as he is living in his truck here and he feels he should be in a better environment where he's from and will not make the follow appointments currently scheduled. I advised to keep at least the first 1 week appointment which he declined. I advised I will speak with provider on 9/30/2022 and let him know the outcome of his concerns. Pt voiced understanding with no additional questions or concerns.

## 2022-09-30 ENCOUNTER — TELEPHONE (OUTPATIENT)
Dept: ORTHOPEDICS | Facility: CLINIC | Age: 27
End: 2022-09-30
Payer: COMMERCIAL

## 2022-09-30 NOTE — TELEPHONE ENCOUNTER
I have attempted without success to contact this patient by phone to get location to where pt will be flying to, to continue care. Unable to leave VM mailbox is full

## 2022-10-03 ENCOUNTER — TELEPHONE (OUTPATIENT)
Dept: ORTHOPEDICS | Facility: CLINIC | Age: 27
End: 2022-10-03
Payer: COMMERCIAL

## 2022-10-03 ENCOUNTER — OFFICE VISIT (OUTPATIENT)
Dept: ORTHOPEDICS | Facility: CLINIC | Age: 27
End: 2022-10-03
Payer: COMMERCIAL

## 2022-10-03 DIAGNOSIS — Z98.890 POST-OPERATIVE STATE: Primary | ICD-10-CM

## 2022-10-03 LAB
BACTERIA FLD AEROBE CULT: NO GROWTH
GRAM STN SPEC: NORMAL
GRAM STN SPEC: NORMAL

## 2022-10-03 PROCEDURE — 3044F PR MOST RECENT HEMOGLOBIN A1C LEVEL <7.0%: ICD-10-PCS | Mod: CPTII,S$GLB,, | Performed by: PHYSICIAN ASSISTANT

## 2022-10-03 PROCEDURE — 99999 PR PBB SHADOW E&M-EST. PATIENT-LVL II: ICD-10-PCS | Mod: PBBFAC,,, | Performed by: PHYSICIAN ASSISTANT

## 2022-10-03 PROCEDURE — 99024 POSTOP FOLLOW-UP VISIT: CPT | Mod: S$GLB,,, | Performed by: PHYSICIAN ASSISTANT

## 2022-10-03 PROCEDURE — 99999 PR PBB SHADOW E&M-EST. PATIENT-LVL II: CPT | Mod: PBBFAC,,, | Performed by: PHYSICIAN ASSISTANT

## 2022-10-03 PROCEDURE — 29125 PR APPLY FOREARM SPLINT,STATIC: ICD-10-PCS | Mod: 58,,, | Performed by: PHYSICIAN ASSISTANT

## 2022-10-03 PROCEDURE — 99024 PR POST-OP FOLLOW-UP VISIT: ICD-10-PCS | Mod: S$GLB,,, | Performed by: PHYSICIAN ASSISTANT

## 2022-10-03 PROCEDURE — 29125 APPL SHORT ARM SPLINT STATIC: CPT | Mod: 58,,, | Performed by: PHYSICIAN ASSISTANT

## 2022-10-03 PROCEDURE — 3044F HG A1C LEVEL LT 7.0%: CPT | Mod: CPTII,S$GLB,, | Performed by: PHYSICIAN ASSISTANT

## 2022-10-03 NOTE — TELEPHONE ENCOUNTER
----- Message from Clementina Gentile sent at 10/3/2022  1:34 PM CDT -----  Type:  Patient Returning Call    Who Called: self     Who Left Message for Patient: Lalita Kowalski LPN     Does the patient know what this is regarding?: yes/ patient is leaving tomorrow, also would like to know if he can get his hand rewrapped, he is uncomfortable.     Would the patient rather a call back or a response via [facility]? Call back     Best Call Back Number: [phone]

## 2022-10-03 NOTE — PROGRESS NOTES
Pt presents for splint change. He is s/p below surgery. He reports the splint is a little tight. He reports a small area of numbness at the ulnar forearm just distal to the bite wound/ incision extending about 2 cm proximally. He has normal sensation throughout the ulnar, median, and radial distribution of the hand.     Date of Procedure: 9/28/2022      Procedure: Procedure(s) (LRB):  REPAIR, TENDON, FLEXOR, right forearm (Right)  INCISION AND DRAINAGE, UPPER EXTREMITY, right (Right)      Surgeon(s) and Role:     * Adwoa Nelson MD - Primary    New plaster dorsal blocking splint applied. He will be moving to [place] tomorrow and is unable to follow up with us. I have recommended he establish care with a local hand surgery clinic for contiuned post operative care. He will begin therapy 2 wks post op, external therapy orders and records will be faxed.

## 2022-10-03 NOTE — TELEPHONE ENCOUNTER
Spoke with pt advised he can come in for 3:00 pm to get splint changed and he can provide information to primary doctor in [place] at splint change who will follow up after surgery.

## 2022-10-05 NOTE — PROGRESS NOTES
I have personally taken the history and examined the patient. I agree with the Hand Surgery PA's note. The plan will be :     Plan:   Plan for surgery for right upper extremity I&D and flexor tendon, FCU, repair. Consents reviewed and signed in clinic all questions answered.   Pt placed in right plaster dorsal blocking splint with wrist and fingers in slight flexion   RTC PO    Of note patient also has numbness in the ulnar sensory distribution along the arm but not into the palm

## 2022-10-07 LAB — BACTERIA SPEC ANAEROBE CULT: NORMAL

## 2022-10-10 ENCOUNTER — TELEPHONE (OUTPATIENT)
Dept: ORTHOPEDICS | Facility: CLINIC | Age: 27
End: 2022-10-10
Payer: COMMERCIAL

## 2022-10-10 NOTE — TELEPHONE ENCOUNTER
----- Message from Hussain Aiken sent at 10/10/2022  1:59 PM CDT -----      Name of Who is Calling: JACI KOEHLER [[id number]]      What is the request in detail: Pt called regarding records that was suppose to be sent to his [place] PCP office said they never received anything.Please contact to further discuss and advise.          Can the clinic reply by [facility]: Y      What Number to Call Back if not in [facility]: PCP Dr. Franc[facility] Fax [phone]

## 2022-10-10 NOTE — TELEPHONE ENCOUNTER
Spoke with pt advised to have office fax over medical records release and records will be sent over, provided fax number to our office to have them sent. Pt voiced understanding and was thankful.

## 2022-10-31 LAB — FUNGUS SPEC CULT: NORMAL

## 2022-11-16 LAB
ACID FAST MOD KINY STN SPEC: NORMAL
MYCOBACTERIUM SPEC QL CULT: NORMAL

## (undated) DEVICE — DRESSING N ADH OIL EMUL 3X3

## (undated) DEVICE — SOL PVP-I SCRUB 7.5% 4OZ

## (undated) DEVICE — BLADE TONGUE DEPRESSOR STRL

## (undated) DEVICE — NDL SAFETY 22G X 1.5 ECLIPSE

## (undated) DEVICE — SYR B-D DISP CONTROL 10CC100/C

## (undated) DEVICE — SUT 3/0 18IN PDS II CLR MON

## (undated) DEVICE — SUT 4/0 18IN ETHILON BL P3

## (undated) DEVICE — BANDAGE MATRIX HK LOOP 2IN 5YD

## (undated) DEVICE — BANDAGE MATRIX HK LOOP 4IN 5YD

## (undated) DEVICE — SPONGE COTTON TRAY 4X4IN

## (undated) DEVICE — SUT PROLENE 3-0 30SH

## (undated) DEVICE — PAD CAST SPECIALIST STRL 4

## (undated) DEVICE — GLOVE BIOGEL PI MICRO INDIC 7

## (undated) DEVICE — NDL 18GA X1 1/2 REG BEVEL

## (undated) DEVICE — APPLICATOR CHLORAPREP ORN 26ML

## (undated) DEVICE — CORD BIPOLAR 12 FOOT

## (undated) DEVICE — IMMOBILIZER HAND ALUMINUM LRG

## (undated) DEVICE — BLADE SURG STAINLESS STEEL #15

## (undated) DEVICE — SUT ETHILON 3-0 PS2 18 BLK

## (undated) DEVICE — SEE L#120831

## (undated) DEVICE — GLOVE BIOGEL ECLIPSE SZ 7

## (undated) DEVICE — GAUZE DERMACEA LOW PLY 3X4YRDS

## (undated) DEVICE — SYR 10CC LUER LOCK

## (undated) DEVICE — TOURNIQUET SB QC DP 18X4IN

## (undated) DEVICE — PAD UNDERPAD 30X30

## (undated) DEVICE — BLADE SURG STAINLESS STEEL #10

## (undated) DEVICE — SLING ARM LARGE FOAM STRAP

## (undated) DEVICE — PACK UPPER EXTREMITY BAPTIST

## (undated) DEVICE — DRAPE STERI-DRAPE 1000 17X11IN

## (undated) DEVICE — FORCEP STRAIGHT DISP

## (undated) DEVICE — GOWN NONREINF SET-IN SLV XL